# Patient Record
Sex: MALE | Race: BLACK OR AFRICAN AMERICAN | NOT HISPANIC OR LATINO | URBAN - METROPOLITAN AREA
[De-identification: names, ages, dates, MRNs, and addresses within clinical notes are randomized per-mention and may not be internally consistent; named-entity substitution may affect disease eponyms.]

---

## 2019-12-05 ENCOUNTER — EMERGENCY (EMERGENCY)
Facility: HOSPITAL | Age: 64
LOS: 1 days | Discharge: HOME | End: 2019-12-05
Attending: EMERGENCY MEDICINE | Admitting: EMERGENCY MEDICINE
Payer: MEDICAID

## 2019-12-05 VITALS
HEART RATE: 63 BPM | RESPIRATION RATE: 18 BRPM | DIASTOLIC BLOOD PRESSURE: 71 MMHG | TEMPERATURE: 98 F | SYSTOLIC BLOOD PRESSURE: 129 MMHG | OXYGEN SATURATION: 100 %

## 2019-12-05 VITALS
TEMPERATURE: 99 F | HEART RATE: 73 BPM | DIASTOLIC BLOOD PRESSURE: 90 MMHG | OXYGEN SATURATION: 99 % | RESPIRATION RATE: 18 BRPM | SYSTOLIC BLOOD PRESSURE: 144 MMHG

## 2019-12-05 LAB
ALBUMIN SERPL ELPH-MCNC: 4.7 G/DL — SIGNIFICANT CHANGE UP (ref 3.3–5)
ALP SERPL-CCNC: 85 U/L — SIGNIFICANT CHANGE UP (ref 40–120)
ALT FLD-CCNC: 32 U/L — SIGNIFICANT CHANGE UP (ref 4–41)
ANION GAP SERPL CALC-SCNC: 13 MMO/L — SIGNIFICANT CHANGE UP (ref 7–14)
AST SERPL-CCNC: 42 U/L — HIGH (ref 4–40)
BILIRUB SERPL-MCNC: < 0.2 MG/DL — LOW (ref 0.2–1.2)
BUN SERPL-MCNC: 10 MG/DL — SIGNIFICANT CHANGE UP (ref 7–23)
CALCIUM SERPL-MCNC: 9.5 MG/DL — SIGNIFICANT CHANGE UP (ref 8.4–10.5)
CHLORIDE SERPL-SCNC: 101 MMOL/L — SIGNIFICANT CHANGE UP (ref 98–107)
CO2 SERPL-SCNC: 25 MMOL/L — SIGNIFICANT CHANGE UP (ref 22–31)
CREAT SERPL-MCNC: 0.75 MG/DL — SIGNIFICANT CHANGE UP (ref 0.5–1.3)
GLUCOSE SERPL-MCNC: 92 MG/DL — SIGNIFICANT CHANGE UP (ref 70–99)
HCT VFR BLD CALC: 32.8 % — LOW (ref 39–50)
HGB BLD-MCNC: 9.5 G/DL — LOW (ref 13–17)
MCHC RBC-ENTMCNC: 22.9 PG — LOW (ref 27–34)
MCHC RBC-ENTMCNC: 29 % — LOW (ref 32–36)
MCV RBC AUTO: 79.2 FL — LOW (ref 80–100)
NRBC # FLD: 0 K/UL — SIGNIFICANT CHANGE UP (ref 0–0)
OB PNL STL: NEGATIVE — SIGNIFICANT CHANGE UP
PLATELET # BLD AUTO: 304 K/UL — SIGNIFICANT CHANGE UP (ref 150–400)
PMV BLD: 10 FL — SIGNIFICANT CHANGE UP (ref 7–13)
POTASSIUM SERPL-MCNC: 3.8 MMOL/L — SIGNIFICANT CHANGE UP (ref 3.5–5.3)
POTASSIUM SERPL-SCNC: 3.8 MMOL/L — SIGNIFICANT CHANGE UP (ref 3.5–5.3)
PROT SERPL-MCNC: 8.2 G/DL — SIGNIFICANT CHANGE UP (ref 6–8.3)
RBC # BLD: 4.14 M/UL — LOW (ref 4.2–5.8)
RBC # FLD: 16.9 % — HIGH (ref 10.3–14.5)
SODIUM SERPL-SCNC: 139 MMOL/L — SIGNIFICANT CHANGE UP (ref 135–145)
TROPONIN T, HIGH SENSITIVITY: 8 NG/L — SIGNIFICANT CHANGE UP (ref ?–14)
TROPONIN T, HIGH SENSITIVITY: 8 NG/L — SIGNIFICANT CHANGE UP (ref ?–14)
WBC # BLD: 4.92 K/UL — SIGNIFICANT CHANGE UP (ref 3.8–10.5)
WBC # FLD AUTO: 4.92 K/UL — SIGNIFICANT CHANGE UP (ref 3.8–10.5)

## 2019-12-05 PROCEDURE — 99284 EMERGENCY DEPT VISIT MOD MDM: CPT | Mod: 25

## 2019-12-05 PROCEDURE — 71046 X-RAY EXAM CHEST 2 VIEWS: CPT | Mod: 26

## 2019-12-05 RX ORDER — ACETAMINOPHEN 500 MG
650 TABLET ORAL ONCE
Refills: 0 | Status: COMPLETED | OUTPATIENT
Start: 2019-12-05 | End: 2019-12-05

## 2019-12-05 RX ORDER — LEVETIRACETAM 250 MG/1
1000 TABLET, FILM COATED ORAL ONCE
Refills: 0 | Status: COMPLETED | OUTPATIENT
Start: 2019-12-05 | End: 2019-12-05

## 2019-12-05 RX ORDER — QUETIAPINE FUMARATE 200 MG/1
50 TABLET, FILM COATED ORAL ONCE
Refills: 0 | Status: COMPLETED | OUTPATIENT
Start: 2019-12-05 | End: 2019-12-05

## 2019-12-05 RX ADMIN — QUETIAPINE FUMARATE 50 MILLIGRAM(S): 200 TABLET, FILM COATED ORAL at 21:03

## 2019-12-05 RX ADMIN — Medication 650 MILLIGRAM(S): at 16:00

## 2019-12-05 RX ADMIN — LEVETIRACETAM 1000 MILLIGRAM(S): 250 TABLET, FILM COATED ORAL at 21:03

## 2019-12-05 NOTE — ED PROVIDER NOTE - CLINICAL SUMMARY MEDICAL DECISION MAKING FREE TEXT BOX
65 yo m w/ multiple medical comorbidities and psych d/o pw cp x 1 day. pt well appearing, vss, physical exam benign. ekg nsr. pt reports pain in r side near 2nd ICS which is reproducible. pt mobile w/ no leg swelling, unlikely pe. pt has cardiac hx however sx do not appear consistent w/ acs. will obtain cxr, labs to r/o acs. no abd component to complaint or exam, will not pursue at this time. reassess. 63 yo m w/ multiple medical comorbidities and psych d/o pw cp x 1 day. pt well appearing, vss, physical exam benign. ekg nsr. pt reports pain in r side near 2nd ICS which is reproducible. pt mobile w/ no leg swelling, has no known PE risk factors, low risk by Well's, unlikely pe. pt has cardiac hx however sx do not appear consistent w/ acs. will obtain cxr, labs to r/o acs. no abd component to complaint or exam, will not pursue at this time. reassess.

## 2019-12-05 NOTE — ED PROVIDER NOTE - NSFOLLOWUPINSTRUCTIONS_ED_ALL_ED_FT
1) Please follow up with your Primary Care Provider in 24-48 hours  2) Seek immediate medical care for any new or returning symptoms including but not limited severe pain, high fevers, difficulty breathing  3) Take Tylenol 650 mg every 4-6 hours as needed for pain. Do not take more than 2 grams within a 24 hour period

## 2019-12-05 NOTE — PROVIDER CONTACT NOTE (OTHER) - ASSESSMENT
Pt is returning to [facility], called [employer], office was closed. Pt is Bed bound, Schizophrenia, arranged [facility]. P/U 10 PM.

## 2019-12-05 NOTE — ED ADULT NURSE NOTE - OBJECTIVE STATEMENT
Patient presents from [facility] for multiple complaints as per transfer paper states "bloating, gas pain, and aggression and attempting to elope." In ed patient is cooperative but a poor historian and endorsing right side chest pain x 1 week, accompanied with MENDOZA." Currently appears in NAD speaking in complete sentences, NSR on cardiac monitor. PCA and MD made aware patient risk for elopement will move closer to RN station for enhanced supervision. Will ctm.

## 2019-12-05 NOTE — ED PROVIDER NOTE - OBJECTIVE STATEMENT
65 yo m pmh htn, hld, cad, epilepsy d/o, cva, schizophrenia, pw cp. pt reports one day hx of r sided anterior cp, non radiating, non positional, non exertional, non pleuritic, aching, intermittent, no known exacerbating/remitting factors, reports hx of similar sx in past. reports hx MI in past. denies hx dvt/pe. reports was in [facility] for a "few days" recently for "epilepsy". triage note mentions abd pain visit to OSH yesterday however pt denies this. denies abd pain, n/v, f/c, sob, trauma, n/t. 63 yo m pmh htn, hld, cad, epilepsy d/o, cva, schizophrenia, pw cp. pt reports one day hx of r sided anterior cp, non radiating, non positional, non exertional, non pleuritic, aching, intermittent, no known exacerbating/remitting factors, reports hx of similar sx in past. reports hx MI in past. denies hx dvt/pe. reports was in [facility] for a "few days" recently for "epilepsy". triage note mentions abd pain visit to OSH yesterday however pt denies this. denies abd pain, n/v, f/c, sob, trauma, n/t. Is well appearing and in NAD upon initial eval.

## 2019-12-05 NOTE — ED ADULT TRIAGE NOTE - CHIEF COMPLAINT QUOTE
Arrives via EMS with c/o chest pain since 11 AM.  Pt evaluated at [facility] yesterday with abdominal pain. Arrives via EMS with c/o chest pain since 11 AM.  Pt evaluated at [facility] yesterday with abdominal pain.  MOLST Full Code

## 2019-12-05 NOTE — ED PROVIDER NOTE - PROGRESS NOTE DETAILS
Patient reassessed, NAD, non-toxic appearing. results dw pt, questions answered. pt reports sx improvement. dc w/ strict return precautions. aaox3  - Preston Moore D.O. PGY2

## 2019-12-05 NOTE — ED PROVIDER NOTE - PATIENT PORTAL LINK FT
You can access the FollowMyHealth Patient Portal offered by [facility] by registering at the following website: [email or web address]. By joining [employer]’s FollowMyHealth portal, you will also be able to view your health information using other applications (apps) compatible with our system.

## 2019-12-05 NOTE — ED PROVIDER NOTE - NS ED ROS FT
GENERAL: No fever or chills, //             EYES: no change in vision, //             HEENT: no trouble swallowing or speaking, //             CARDIAC:  chest pain, //              PULMONARY: no cough or SOB, //             GI: no abdominal pain, no nausea or no vomiting, no diarrhea or constipation, //             : No changes in urination,  //            SKIN: no rashes,  //            NEURO: no headache,  //             MSK: No joint pain otherwise as HPI or negative. ~Preston Moore DO PGY2

## 2019-12-05 NOTE — ED ADULT NURSE NOTE - CHIEF COMPLAINT QUOTE
Arrives via EMS with c/o chest pain since 11 AM.  Pt evaluated at [facility] yesterday with abdominal pain.  MOLST Full Code

## 2019-12-05 NOTE — ED PROVIDER NOTE - ATTENDING CONTRIBUTION TO CARE
Attending Attestation: Dr. Pimentel  I have personally performed a history and physical examination of the patient and discussed management with the resident as well as the patient.  I reviewed the resident's note and agree with the documented findings and plan of care.  I have authored and modified critical sections of the Provider Note, including but not limited to HPI, Physical Exam and MDM. 65 yo m w/ multiple medical comorbidities and psych d/o pw cp x 1 day. pt well appearing, vss, physical exam benign. ekg nsr. pt reports pain in r side near 2nd ICS which is reproducible. pt mobile w/ no leg swelling, has no known PE risk factors, low risk by Well's, unlikely pe. pt has cardiac hx however sx do not appear consistent w/ acs. will obtain cxr, labs to r/o acs. no abd component to complaint or exam, will not pursue at this time. reassess.

## 2019-12-05 NOTE — ED PROVIDER NOTE - PHYSICAL EXAMINATION
General: well appearing, interactive, well nourished, no apparent distress, ncat  HEENT: EOMI, PERRLA, normal mucosa, normal oropharynx, no lesions on the lips or on oral mucosa, normal external ear  Neck: supple, no lymphadenopathy, full range of motion, no nuchal rigidity  CV: RRR, normal S1 and S2 with no murmur, capillary refill less than two seconds, pp 2+   Resp: lungs CTA b/l, good aeration bilaterally, symmetric chest wall   Abd: non-distended, soft, non-tender  : no CVA tenderness  MSK: full range of motion, no cyanosis, no edema, no clubbing, no immobility  Neuro: CN II-XII grossly intact, muscle strength 5/5 in all extremities, normal gait  Skin: no rashes, skin intact General: well appearing, interactive, well nourished, no apparent distress, ncat  HEENT: EOMI, PERRLA, normal mucosa, normal oropharynx, no lesions on the lips or on oral mucosa, normal external ear  Neck: supple, no lymphadenopathy, full range of motion, no nuchal rigidity  CV: RRR, normal S1 and S2 with no murmur, capillary refill less than two seconds, pp 2+   Resp: lungs CTA b/l, good aeration bilaterally, symmetric chest wall   Abd: non-distended, soft, non-tender  : no CVA tenderness  MSK: full range of motion, no cyanosis, no edema, no clubbing, no immobility  Neuro: CN II-XII grossly intact, muscle strength 5/5 in all extremities, normal gait, slow speech at baseline (residua from CVA)  Skin: no rashes, skin intact General: well appearing, interactive, well nourished, no apparent distress, ncat  HEENT: EOMI, PERRLA, normal mucosa, normal oropharynx, no lesions on the lips or on oral mucosa, normal external ear  Neck: supple, no lymphadenopathy, full range of motion, no nuchal rigidity  CV: RRR, normal S1 and S2 with no murmur, capillary refill less than two seconds, pp 2+   Resp: lungs CTA b/l, good aeration bilaterally, symmetric chest wall   Abd: non-distended, soft, non-tender  : no CVA tenderness  MSK: full range of motion, no cyanosis, no edema, no clubbing, no immobility  Neuro: CN II-XII grossly intact, muscle strength 5/5 in all extremities, normal gait, slow speech at baseline (residua from CVA)  Skin: no rashes, skin intact  rectal: no hemorrhoids, brown stool, (chaperone: viktoriya, rn)

## 2019-12-05 NOTE — ED ADULT NURSE REASSESSMENT NOTE - NS ED NURSE REASSESS COMMENT FT1
Unable to obtain IV access, 2 RN with unsuccessful attempts for IV access. MD Pimentel and Teresa aware for US guided IV.

## 2020-01-14 ENCOUNTER — INPATIENT (INPATIENT)
Facility: HOSPITAL | Age: 65
LOS: 1 days | Discharge: NURSING, REHAB OR LONG-TERM CARE | DRG: 313 | End: 2020-01-16
Attending: INTERNAL MEDICINE | Admitting: INTERNAL MEDICINE
Payer: MEDICAID

## 2020-01-14 VITALS
HEART RATE: 84 BPM | SYSTOLIC BLOOD PRESSURE: 150 MMHG | WEIGHT: 203.93 LBS | OXYGEN SATURATION: 96 % | RESPIRATION RATE: 18 BRPM | HEIGHT: 69 IN | DIASTOLIC BLOOD PRESSURE: 93 MMHG | TEMPERATURE: 98 F

## 2020-01-14 DIAGNOSIS — E11.9 TYPE 2 DIABETES MELLITUS WITHOUT COMPLICATIONS: ICD-10-CM

## 2020-01-14 DIAGNOSIS — F20.9 SCHIZOPHRENIA, UNSPECIFIED: ICD-10-CM

## 2020-01-14 DIAGNOSIS — R07.9 CHEST PAIN, UNSPECIFIED: ICD-10-CM

## 2020-01-14 DIAGNOSIS — R79.1 ABNORMAL COAGULATION PROFILE: ICD-10-CM

## 2020-01-14 DIAGNOSIS — G40.909 EPILEPSY, UNSPECIFIED, NOT INTRACTABLE, WITHOUT STATUS EPILEPTICUS: ICD-10-CM

## 2020-01-14 DIAGNOSIS — D64.9 ANEMIA, UNSPECIFIED: ICD-10-CM

## 2020-01-14 DIAGNOSIS — Z29.9 ENCOUNTER FOR PROPHYLACTIC MEASURES, UNSPECIFIED: ICD-10-CM

## 2020-01-14 LAB
APTT BLD: 29.5 SEC — SIGNIFICANT CHANGE UP (ref 27.5–36.3)
APTT BLD: 31.7 SEC — SIGNIFICANT CHANGE UP (ref 27.5–36.3)
BASOPHILS # BLD AUTO: 0.03 K/UL — SIGNIFICANT CHANGE UP (ref 0–0.2)
BASOPHILS NFR BLD AUTO: 0.4 % — SIGNIFICANT CHANGE UP (ref 0–2)
EOSINOPHIL # BLD AUTO: 0.02 K/UL — SIGNIFICANT CHANGE UP (ref 0–0.5)
EOSINOPHIL NFR BLD AUTO: 0.3 % — SIGNIFICANT CHANGE UP (ref 0–6)
GLUCOSE BLDC GLUCOMTR-MCNC: 103 MG/DL — HIGH (ref 70–99)
GLUCOSE BLDC GLUCOMTR-MCNC: 115 MG/DL — HIGH (ref 70–99)
GLUCOSE BLDC GLUCOMTR-MCNC: 115 MG/DL — HIGH (ref 70–99)
GLUCOSE BLDC GLUCOMTR-MCNC: 118 MG/DL — HIGH (ref 70–99)
HCT VFR BLD CALC: 31.2 % — LOW (ref 39–50)
HCT VFR BLD CALC: 31.2 % — LOW (ref 39–50)
HGB BLD-MCNC: 8.9 G/DL — LOW (ref 13–17)
HGB BLD-MCNC: 9.3 G/DL — LOW (ref 13–17)
IMM GRANULOCYTES NFR BLD AUTO: 0.3 % — SIGNIFICANT CHANGE UP (ref 0–1.5)
INR BLD: 1.14 RATIO — SIGNIFICANT CHANGE UP (ref 0.88–1.16)
INR BLD: 1.2 RATIO — HIGH (ref 0.88–1.16)
LIDOCAIN IGE QN: 20 U/L — SIGNIFICANT CHANGE UP (ref 7–60)
LYMPHOCYTES # BLD AUTO: 0.85 K/UL — LOW (ref 1–3.3)
LYMPHOCYTES # BLD AUTO: 12.6 % — LOW (ref 13–44)
MCHC RBC-ENTMCNC: 22.3 PG — LOW (ref 27–34)
MCHC RBC-ENTMCNC: 22.9 PG — LOW (ref 27–34)
MCHC RBC-ENTMCNC: 28.5 GM/DL — LOW (ref 32–36)
MCHC RBC-ENTMCNC: 29.8 GM/DL — LOW (ref 32–36)
MCV RBC AUTO: 76.7 FL — LOW (ref 80–100)
MCV RBC AUTO: 78.2 FL — LOW (ref 80–100)
MONOCYTES # BLD AUTO: 0.34 K/UL — SIGNIFICANT CHANGE UP (ref 0–0.9)
MONOCYTES NFR BLD AUTO: 5.1 % — SIGNIFICANT CHANGE UP (ref 2–14)
NEUTROPHILS # BLD AUTO: 5.46 K/UL — SIGNIFICANT CHANGE UP (ref 1.8–7.4)
NEUTROPHILS NFR BLD AUTO: 81.3 % — HIGH (ref 43–77)
NRBC # BLD: 0 /100 WBCS — SIGNIFICANT CHANGE UP (ref 0–0)
PLATELET # BLD AUTO: 264 K/UL — SIGNIFICANT CHANGE UP (ref 150–400)
PLATELET # BLD AUTO: 308 K/UL — SIGNIFICANT CHANGE UP (ref 150–400)
PROTHROM AB SERPL-ACNC: 13.2 SEC — HIGH (ref 10–13.1)
PROTHROM AB SERPL-ACNC: 13.8 SEC — HIGH (ref 10–12.9)
RBC # BLD: 3.99 M/UL — LOW (ref 4.2–5.8)
RBC # BLD: 4.07 M/UL — LOW (ref 4.2–5.8)
RBC # FLD: 16.7 % — HIGH (ref 10.3–14.5)
RBC # FLD: 16.8 % — HIGH (ref 10.3–14.5)
TROPONIN T, HIGH SENSITIVITY RESULT: 13 NG/L — SIGNIFICANT CHANGE UP (ref 0–51)
TROPONIN T, HIGH SENSITIVITY RESULT: 14 NG/L — SIGNIFICANT CHANGE UP (ref 0–51)
WBC # BLD: 5.85 K/UL — SIGNIFICANT CHANGE UP (ref 3.8–10.5)
WBC # BLD: 6.72 K/UL — SIGNIFICANT CHANGE UP (ref 3.8–10.5)
WBC # FLD AUTO: 5.85 K/UL — SIGNIFICANT CHANGE UP (ref 3.8–10.5)
WBC # FLD AUTO: 6.72 K/UL — SIGNIFICANT CHANGE UP (ref 3.8–10.5)

## 2020-01-14 PROCEDURE — 71045 X-RAY EXAM CHEST 1 VIEW: CPT | Mod: 26

## 2020-01-14 PROCEDURE — 99285 EMERGENCY DEPT VISIT HI MDM: CPT

## 2020-01-14 PROCEDURE — 99223 1ST HOSP IP/OBS HIGH 75: CPT | Mod: AI,GC

## 2020-01-14 PROCEDURE — 93306 TTE W/DOPPLER COMPLETE: CPT | Mod: 26

## 2020-01-14 RX ORDER — METOPROLOL TARTRATE 50 MG
25 TABLET ORAL DAILY
Refills: 0 | Status: DISCONTINUED | OUTPATIENT
Start: 2020-01-14 | End: 2020-01-16

## 2020-01-14 RX ORDER — FAMOTIDINE 10 MG/ML
20 INJECTION INTRAVENOUS ONCE
Refills: 0 | Status: COMPLETED | OUTPATIENT
Start: 2020-01-14 | End: 2020-01-14

## 2020-01-14 RX ORDER — GLUCAGON INJECTION, SOLUTION 0.5 MG/.1ML
1 INJECTION, SOLUTION SUBCUTANEOUS ONCE
Refills: 0 | Status: DISCONTINUED | OUTPATIENT
Start: 2020-01-14 | End: 2020-01-16

## 2020-01-14 RX ORDER — INSULIN LISPRO 100/ML
VIAL (ML) SUBCUTANEOUS
Refills: 0 | Status: DISCONTINUED | OUTPATIENT
Start: 2020-01-14 | End: 2020-01-16

## 2020-01-14 RX ORDER — ENOXAPARIN SODIUM 100 MG/ML
40 INJECTION SUBCUTANEOUS DAILY
Refills: 0 | Status: DISCONTINUED | OUTPATIENT
Start: 2020-01-14 | End: 2020-01-16

## 2020-01-14 RX ORDER — TAMSULOSIN HYDROCHLORIDE 0.4 MG/1
0.4 CAPSULE ORAL AT BEDTIME
Refills: 0 | Status: DISCONTINUED | OUTPATIENT
Start: 2020-01-14 | End: 2020-01-16

## 2020-01-14 RX ORDER — ASPIRIN/CALCIUM CARB/MAGNESIUM 324 MG
81 TABLET ORAL DAILY
Refills: 0 | Status: DISCONTINUED | OUTPATIENT
Start: 2020-01-14 | End: 2020-01-16

## 2020-01-14 RX ORDER — HALOPERIDOL DECANOATE 100 MG/ML
5 INJECTION INTRAMUSCULAR ONCE
Refills: 0 | Status: COMPLETED | OUTPATIENT
Start: 2020-01-14 | End: 2020-01-14

## 2020-01-14 RX ORDER — SODIUM CHLORIDE 9 MG/ML
1000 INJECTION, SOLUTION INTRAVENOUS
Refills: 0 | Status: DISCONTINUED | OUTPATIENT
Start: 2020-01-14 | End: 2020-01-16

## 2020-01-14 RX ORDER — INSULIN LISPRO 100/ML
VIAL (ML) SUBCUTANEOUS AT BEDTIME
Refills: 0 | Status: DISCONTINUED | OUTPATIENT
Start: 2020-01-14 | End: 2020-01-16

## 2020-01-14 RX ORDER — DEXTROSE 50 % IN WATER 50 %
25 SYRINGE (ML) INTRAVENOUS ONCE
Refills: 0 | Status: DISCONTINUED | OUTPATIENT
Start: 2020-01-14 | End: 2020-01-16

## 2020-01-14 RX ORDER — ISOSORBIDE MONONITRATE 60 MG/1
30 TABLET, EXTENDED RELEASE ORAL DAILY
Refills: 0 | Status: DISCONTINUED | OUTPATIENT
Start: 2020-01-14 | End: 2020-01-14

## 2020-01-14 RX ORDER — QUETIAPINE FUMARATE 200 MG/1
50 TABLET, FILM COATED ORAL
Refills: 0 | Status: DISCONTINUED | OUTPATIENT
Start: 2020-01-14 | End: 2020-01-15

## 2020-01-14 RX ORDER — ACETAMINOPHEN 500 MG
650 TABLET ORAL ONCE
Refills: 0 | Status: COMPLETED | OUTPATIENT
Start: 2020-01-14 | End: 2020-01-14

## 2020-01-14 RX ORDER — DEXTROSE 50 % IN WATER 50 %
12.5 SYRINGE (ML) INTRAVENOUS ONCE
Refills: 0 | Status: DISCONTINUED | OUTPATIENT
Start: 2020-01-14 | End: 2020-01-16

## 2020-01-14 RX ORDER — DEXTROSE 50 % IN WATER 50 %
15 SYRINGE (ML) INTRAVENOUS ONCE
Refills: 0 | Status: DISCONTINUED | OUTPATIENT
Start: 2020-01-14 | End: 2020-01-16

## 2020-01-14 RX ORDER — OXCARBAZEPINE 300 MG/1
600 TABLET, FILM COATED ORAL
Refills: 0 | Status: DISCONTINUED | OUTPATIENT
Start: 2020-01-14 | End: 2020-01-16

## 2020-01-14 RX ORDER — KETOROLAC TROMETHAMINE 30 MG/ML
15 SYRINGE (ML) INJECTION ONCE
Refills: 0 | Status: DISCONTINUED | OUTPATIENT
Start: 2020-01-14 | End: 2020-01-14

## 2020-01-14 RX ORDER — LEVETIRACETAM 250 MG/1
1000 TABLET, FILM COATED ORAL
Refills: 0 | Status: DISCONTINUED | OUTPATIENT
Start: 2020-01-14 | End: 2020-01-16

## 2020-01-14 RX ORDER — CITALOPRAM 10 MG/1
10 TABLET, FILM COATED ORAL DAILY
Refills: 0 | Status: DISCONTINUED | OUTPATIENT
Start: 2020-01-14 | End: 2020-01-15

## 2020-01-14 RX ORDER — SIMVASTATIN 20 MG/1
40 TABLET, FILM COATED ORAL AT BEDTIME
Refills: 0 | Status: DISCONTINUED | OUTPATIENT
Start: 2020-01-14 | End: 2020-01-16

## 2020-01-14 RX ADMIN — QUETIAPINE FUMARATE 50 MILLIGRAM(S): 200 TABLET, FILM COATED ORAL at 18:03

## 2020-01-14 RX ADMIN — Medication 2 MILLIGRAM(S): at 02:45

## 2020-01-14 RX ADMIN — HALOPERIDOL DECANOATE 5 MILLIGRAM(S): 100 INJECTION INTRAMUSCULAR at 20:19

## 2020-01-14 RX ADMIN — FAMOTIDINE 20 MILLIGRAM(S): 10 INJECTION INTRAVENOUS at 06:34

## 2020-01-14 RX ADMIN — LEVETIRACETAM 1000 MILLIGRAM(S): 250 TABLET, FILM COATED ORAL at 18:03

## 2020-01-14 RX ADMIN — CITALOPRAM 10 MILLIGRAM(S): 10 TABLET, FILM COATED ORAL at 11:58

## 2020-01-14 RX ADMIN — Medication 81 MILLIGRAM(S): at 11:58

## 2020-01-14 RX ADMIN — Medication 15 MILLIGRAM(S): at 13:25

## 2020-01-14 RX ADMIN — HALOPERIDOL DECANOATE 5 MILLIGRAM(S): 100 INJECTION INTRAMUSCULAR at 02:45

## 2020-01-14 RX ADMIN — Medication 25 MILLIGRAM(S): at 18:31

## 2020-01-14 RX ADMIN — SIMVASTATIN 40 MILLIGRAM(S): 20 TABLET, FILM COATED ORAL at 21:14

## 2020-01-14 RX ADMIN — Medication 15 MILLIGRAM(S): at 14:13

## 2020-01-14 RX ADMIN — TAMSULOSIN HYDROCHLORIDE 0.4 MILLIGRAM(S): 0.4 CAPSULE ORAL at 21:14

## 2020-01-14 RX ADMIN — Medication 650 MILLIGRAM(S): at 18:30

## 2020-01-14 RX ADMIN — Medication 30 MILLILITER(S): at 06:34

## 2020-01-14 RX ADMIN — ENOXAPARIN SODIUM 40 MILLIGRAM(S): 100 INJECTION SUBCUTANEOUS at 11:59

## 2020-01-14 RX ADMIN — OXCARBAZEPINE 600 MILLIGRAM(S): 300 TABLET, FILM COATED ORAL at 18:31

## 2020-01-14 NOTE — H&P ADULT - PROBLEM SELECTOR PLAN 1
Recently admitted to [facility], where work-up was done.   - Attempted to reach next of kin, Aunt Lia, who was busy when called. Asked to call later.  - EKG shows NSR. HStrop wnl. Will f/u repeat.  - obtain records from [facility].  - Continue imdur and simvastatin.

## 2020-01-14 NOTE — CHART NOTE - NSCHARTNOTEFT_GEN_A_CORE
Called  by  Rn  to  evaluate, Pt agitated,, trying to get up off stretcher. Difficult to redirect, appeared to be at harm to self and others. Not cooperating with medical staff. EKG demonstrated normal Qtc (444). Pt is on standing doses of seroquil and haldol decanoate at his facility.  Haldol 5mg IVPX1 given for agitation.  Pt  placed  on  1:1  at  this  time  for  safety. Will continue to monitor.

## 2020-01-14 NOTE — H&P ADULT - HISTORY OF PRESENT ILLNESS
63 yo M w/ PMHx of epilepsy, CAD, GERD, schizophrenia, HLD, depressive, BPH, DM, bipolar, dementia, HTN, TIA, ICH presents to ED via EMS from nursing home c/o chest pain. Per EMS pt was recently seen at [facility] 2 days ago for same symptoms and d/c back to nursing home after negative cardiac workup. Upon exam pt agitated, yelling, combative with staff. Pt reports pain but uncooperative to answer questions or participate in assessment. Per chart from nursing home pt takes seroquil and haldol at facility, unclear of time of last dose. 64 M with epilepsy, CAD, GERD, schizophrenia, HLD, depression, BPH, DM2, dementia, HTN, TIA presents to ED from nursing home for CP. Patient was admitted to [facility] 2 days ago for same symptoms with negative cardiac work-up and apparently discharged to nursing home, per EMS report. On initial presentation to ED, patient was agitated and yelling at staff. He was given haldol and ativan and is now slow to respond. Nursing home records include diagnoses and home medications, but not much else. Patient is prescribed 125 ug haldol q month.    Patient unable to answer questions currently: very slow to respond. Currently A+Ox1. Cannot elaborate on CP. Had a run on SVT on telemetry, which self-resolved. Vitals are otherwise stable.

## 2020-01-14 NOTE — H&P ADULT - ATTENDING COMMENTS
64 M with epilepsy, CAD, GERD, schizophrenia, HLD, depression, BPH, DM2, dementia, HTN, TIA presents from nursing home for CP, agitated in ED s/p Haldol and Ativan, now unable to give Hx., brief SVT on tele.  - Tele monitor for further arrythmia, will check serial cardiac enzymes  - will attempt to obtain additional Hx and recent w/u from [facility] later today 64 M with epilepsy, CAD, GERD, schizophrenia, HLD, depression, BPH, DM2, dementia, HTN, TIA presents from nursing home for CP, agitated in ED s/p Haldol and Ativan, now unable to give Hx., brief SVT on tele.  - Tele monitor for further arrythmia, will check serial cardiac enzymes  - will check FLP, A1c and TSH for risk modification  - will attempt to obtain additional Hx and recent w/u from [facility] later today

## 2020-01-14 NOTE — ED PROVIDER NOTE - OBJECTIVE STATEMENT
63 y/o M w/ PMH of CAD, HTN, HLD, schizophrenia, seizure disorder, presenting w/ chest pain. Seen in Room 1R. Pt A&O x2 (place and birth date). Reports developing chest pain over the past day. Pt points to center of chest when asked where his pain is. Denies difficulty breathing. Pt uncooperative with much of questioning. Became agitated and started yelling at staff, required treatment w/ haldol and ativan. Per EMS report pt was seen at OSH 2 days for chest pain and was DC'ed back to nursing home after negative workup.

## 2020-01-14 NOTE — H&P ADULT - ASSESSMENT
64 M with epilepsy, CAD, GERD, schizophrenia, HLD, depression, BPH, DM2, dementia, HTN, TIA presents to ED from nursing home for CP, admitted for further cardiac work-up.

## 2020-01-14 NOTE — ED PROVIDER NOTE - PHYSICAL EXAMINATION
Gen: Agitated, AOx2, non-toxic  Head: NCAT  HEENT: EOMI, oral mucosa moist, normal conjunctiva  Lung: CTAB, no respiratory distress, no wheezes/rhonchi/rales B/L, speaking in full sentences  CV: RRR, no murmurs  Abd: soft, NTND, no guarding  MSK: no visible deformities  Neuro: No focal sensory or motor deficits  Skin: Warm, well perfused  Psych: normal affect

## 2020-01-14 NOTE — CONSULT NOTE ADULT - SUBJECTIVE AND OBJECTIVE BOX
CHIEF COMPLAINT:Patient is a 64y old  Male who presents with a chief complaint of CP (14 Jan 2020 07:49)      HPI:  64 M with epilepsy, CAD, GERD, schizophrenia, HLD, depression, BPH, DM2, dementia, HTN, TIA presents to ED from nursing home for CP. Patient was admitted to [facility] 2 days ago for same symptoms with negative cardiac work-up and apparently discharged to nursing home, per EMS report. On initial presentation to ED, patient was agitated and yelling at staff. He was given haldol and ativan and is now slow to respond. Nursing home records include diagnoses and home medications, but not much else. Patient is prescribed 125 ug haldol q month.    Patient unable to answer questions currently: very slow to respond. Currently A+Ox1. Cannot elaborate on CP. Had a run on SVT on telemetry, which self-resolved. Vitals are otherwise stable. (14 Jan 2020 05:42)      PAST MEDICAL & SURGICAL HISTORY:  History of TIAs  History of BPH  Hyperlipidemia  Type 2 diabetes mellitus  Schizophrenia  No significant past surgical history      MEDICATIONS  (STANDING):  aspirin  chewable 81 milliGRAM(s) Oral daily  citalopram 10 milliGRAM(s) Oral daily  dextrose 5%. 1000 milliLiter(s) (50 mL/Hr) IV Continuous <Continuous>  dextrose 50% Injectable 12.5 Gram(s) IV Push once  dextrose 50% Injectable 25 Gram(s) IV Push once  dextrose 50% Injectable 25 Gram(s) IV Push once  enoxaparin Injectable 40 milliGRAM(s) SubCutaneous daily  insulin lispro (HumaLOG) corrective regimen sliding scale   SubCutaneous three times a day before meals  insulin lispro (HumaLOG) corrective regimen sliding scale   SubCutaneous at bedtime  levETIRAcetam 1000 milliGRAM(s) Oral two times a day  metoprolol succinate ER 25 milliGRAM(s) Oral daily  OXcarbazepine 600 milliGRAM(s) Oral two times a day  QUEtiapine 50 milliGRAM(s) Oral two times a day  simvastatin 40 milliGRAM(s) Oral at bedtime  tamsulosin 0.4 milliGRAM(s) Oral at bedtime    MEDICATIONS  (PRN):  dextrose 40% Gel 15 Gram(s) Oral once PRN Blood Glucose LESS THAN 70 milliGRAM(s)/deciliter  glucagon  Injectable 1 milliGRAM(s) IntraMuscular once PRN Glucose LESS THAN 70 milligrams/deciliter      FAMILY HISTORY:  No pertinent family history in first degree relatives      SOCIAL HISTORY:    [ ] Non-smoker  [ ] Smoker  [ ] Alcohol    Allergies    Allergy Status Unknown    Intolerances    	    REVIEW OF SYSTEMS:  CONSTITUTIONAL: No fever, weight loss, or fatigue  EYES: No eye pain, visual disturbances, or discharge  ENT:  No difficulty hearing, tinnitus, vertigo; No sinus or throat pain  NECK: No pain or stiffness  RESPIRATORY: No cough, wheezing, chills or hemoptysis; No Shortness of Breath  CARDIOVASCULAR: No chest pain, palpitations, passing out, dizziness, or leg swelling  GASTROINTESTINAL: No abdominal or epigastric pain. No nausea, vomiting, or hematemesis; No diarrhea or constipation. No melena or hematochezia.  GENITOURINARY: No dysuria, frequency, hematuria, or incontinence  NEUROLOGICAL: No headaches, memory loss, loss of strength, numbness, or tremors  SKIN: No itching, burning, rashes, or lesions   LYMPH Nodes: No enlarged glands  ENDOCRINE: No heat or cold intolerance; No hair loss  MUSCULOSKELETAL: No joint pain or swelling; No muscle, back, or extremity pain  PSYCHIATRIC: No depression, anxiety, mood swings, or difficulty sleeping  HEME/LYMPH: No easy bruising, or bleeding gums  ALLERGY AND IMMUNOLOGIC: No hives or eczema	    [ ] All others negative	  [ ] Unable to obtain    PHYSICAL EXAM:  T(C): 36.7 (01-14-20 @ 07:40), Max: 36.8 (01-14-20 @ 01:24)  HR: 80 (01-14-20 @ 07:40) (61 - 84)  BP: 138/97 (01-14-20 @ 07:40) (124/85 - 150/93)  RR: 16 (01-14-20 @ 07:40) (14 - 20)  SpO2: 98% (01-14-20 @ 07:40) (96% - 99%)  Wt(kg): --  I&O's Summary      Appearance: Normal	  HEENT:   Normal oral mucosa, PERRL, EOMI	  Lymphatic: No lymphadenopathy  Cardiovascular: Normal S1 S2, No JVD, No murmurs, No edema  Respiratory: Lungs clear to auscultation	  Psychiatry: A & O x 3, Mood & affect appropriate  Gastrointestinal:  Soft, Non-tender, + BS	  Skin: No rashes, No ecchymoses, No cyanosis	  Neurologic: Non-focal  Extremities: Normal range of motion, No clubbing, cyanosis or edema  Vascular: Peripheral pulses palpable 2+ bilaterally    TELEMETRY: 	    ECG:  	  RADIOLOGY:  OTHER: 	  	  LABS:	 	    CARDIAC MARKERS:                              9.3    6.72  )-----------( 308      ( 14 Jan 2020 03:29 )             31.2     01-14    140  |  105  |  10  ----------------------------<  126<H>  3.8   |  25  |  0.68    Ca    9.4      14 Jan 2020 03:29    TPro  8.1  /  Alb  4.4  /  TBili  0.2  /  DBili  x   /  AST  24  /  ALT  30  /  AlkPhos  87  01-14    proBNP:   Lipid Profile:   HgA1c:   TSH:   PT/INR - ( 14 Jan 2020 03:29 )   PT: 13.8 sec;   INR: 1.20 ratio         PTT - ( 14 Jan 2020 03:29 )  PTT:31.7 sec    PREVIOUS DIAGNOSTIC TESTING:    [ ] Echocardiogram:  [ ]  Catheterization:  [ ] Stress Test: CHIEF COMPLAINT:Patient is a 64y old  Male who presents with a chief complaint of CP (14 Jan 2020 07:49)      HPI:  64 M with epilepsy, CAD, GERD, schizophrenia, HLD, depression, BPH, DM2, dementia, HTN, TIA presents to ED from nursing home for CP. Patient was admitted to [facility] 2 days ago for same symptoms with negative cardiac work-up and apparently discharged to nursing home, per EMS report. On initial presentation to ED, patient was agitated and yelling at staff. He was given haldol and ativan and is now slow to respond. Nursing home records include diagnoses and home medications, but not much else. Patient is prescribed 125 ug haldol q month.    Patient unable to answer questions currently: very slow to respond. Currently A+Ox1. Cannot elaborate on CP. Had a run on SVT on telemetry, which self-resolved. Vitals are otherwise stable. (14 Jan 2020 05:42)      PAST MEDICAL & SURGICAL HISTORY:  History of TIAs  History of BPH  Hyperlipidemia  Type 2 diabetes mellitus  Schizophrenia  No significant past surgical history      MEDICATIONS  (STANDING):  aspirin  chewable 81 milliGRAM(s) Oral daily  citalopram 10 milliGRAM(s) Oral daily  dextrose 5%. 1000 milliLiter(s) (50 mL/Hr) IV Continuous <Continuous>  dextrose 50% Injectable 12.5 Gram(s) IV Push once  dextrose 50% Injectable 25 Gram(s) IV Push once  dextrose 50% Injectable 25 Gram(s) IV Push once  enoxaparin Injectable 40 milliGRAM(s) SubCutaneous daily  insulin lispro (HumaLOG) corrective regimen sliding scale   SubCutaneous three times a day before meals  insulin lispro (HumaLOG) corrective regimen sliding scale   SubCutaneous at bedtime  levETIRAcetam 1000 milliGRAM(s) Oral two times a day  metoprolol succinate ER 25 milliGRAM(s) Oral daily  OXcarbazepine 600 milliGRAM(s) Oral two times a day  QUEtiapine 50 milliGRAM(s) Oral two times a day  simvastatin 40 milliGRAM(s) Oral at bedtime  tamsulosin 0.4 milliGRAM(s) Oral at bedtime    MEDICATIONS  (PRN):  dextrose 40% Gel 15 Gram(s) Oral once PRN Blood Glucose LESS THAN 70 milliGRAM(s)/deciliter  glucagon  Injectable 1 milliGRAM(s) IntraMuscular once PRN Glucose LESS THAN 70 milligrams/deciliter      FAMILY HISTORY:  No pertinent family history in first degree relatives      SOCIAL HISTORY:    [ ] Non-smoker  [ ] Smoker  [ ] Alcohol    Allergies    Allergy Status Unknown    Intolerances    	    REVIEW OF SYSTEMS:  CONSTITUTIONAL: No fever, weight loss, or fatigue  EYES: No eye pain, visual disturbances, or discharge  ENT:  No difficulty hearing, tinnitus, vertigo; No sinus or throat pain  NECK: No pain or stiffness  RESPIRATORY: No cough, wheezing, chills or hemoptysis; + Shortness of Breath  CARDIOVASCULAR: No chest pain, palpitations, passing out, dizziness, or leg swelling  GASTROINTESTINAL: No abdominal or epigastric pain. No nausea, vomiting, or hematemesis; No diarrhea or constipation. No melena or hematochezia.  GENITOURINARY: No dysuria, frequency, hematuria, or incontinence  NEUROLOGICAL: No headaches, memory loss, loss of strength, numbness, or tremors  SKIN: No itching, burning, rashes, or lesions   LYMPH Nodes: No enlarged glands  ENDOCRINE: No heat or cold intolerance; No hair loss  MUSCULOSKELETAL: No joint pain or swelling; No muscle, back, or extremity pain  PSYCHIATRIC: No depression, anxiety, mood swings, or difficulty sleeping  HEME/LYMPH: No easy bruising, or bleeding gums  ALLERGY AND IMMUNOLOGIC: No hives or eczema	    [ ] All others negative	  [ ] Unable to obtain    PHYSICAL EXAM:  T(C): 36.7 (01-14-20 @ 07:40), Max: 36.8 (01-14-20 @ 01:24)  HR: 80 (01-14-20 @ 07:40) (61 - 84)  BP: 138/97 (01-14-20 @ 07:40) (124/85 - 150/93)  RR: 16 (01-14-20 @ 07:40) (14 - 20)  SpO2: 98% (01-14-20 @ 07:40) (96% - 99%)  Wt(kg): --  I&O's Summary      Appearance: Normal	  HEENT:   Normal oral mucosa, PERRL, EOMI	  Lymphatic: No lymphadenopathy  Cardiovascular: Normal S1 S2, No JVD, + murmurs, No edema  Respiratory: Lungs clear to auscultation	  Gastrointestinal:  Soft, Non-tender, + BS	  Skin: No rashes, No ecchymoses, No cyanosis	  Neurologic: Non-focal  Extremities: Normal range of motion, No clubbing, cyanosis or edema  Vascular: Peripheral pulses palpable 2+ bilaterally    TELEMETRY: 	    ECG:  	  RADIOLOGY:  OTHER: 	  	  LABS:	 	    CARDIAC MARKERS:                              9.3    6.72  )-----------( 308      ( 14 Jan 2020 03:29 )             31.2     01-14    140  |  105  |  10  ----------------------------<  126<H>  3.8   |  25  |  0.68    Ca    9.4      14 Jan 2020 03:29    TPro  8.1  /  Alb  4.4  /  TBili  0.2  /  DBili  x   /  AST  24  /  ALT  30  /  AlkPhos  87  01-14    proBNP:   Lipid Profile:   HgA1c:   TSH:   PT/INR - ( 14 Jan 2020 03:29 )   PT: 13.8 sec;   INR: 1.20 ratio         PTT - ( 14 Jan 2020 03:29 )  PTT:31.7 sec    PREVIOUS DIAGNOSTIC TESTING:      < from: Xray Chest 1 View- PORTABLE-Urgent (01.14.20 @ 02:47) >  The lungs are clear.  There are no pleural effusions or pneumothorax.  The cardiomediastinal silhouette is within normal limits.    IMPRESSION:   Clear lungs.

## 2020-01-14 NOTE — ED PROVIDER NOTE - CLINICAL SUMMARY MEDICAL DECISION MAKING FREE TEXT BOX
63 y/o M w/ PMH of CAD, HTN, HLD, schizophrenia, seizure disorder, presenting w/ chest pain. High risk given risk factors and age. EKG unremarkable for acute changes. Will check labs. CXR. Plan for admission for chest pain work up. 63 y/o M w/ PMH of CAD, HTN, HLD, schizophrenia, seizure disorder, presenting w/ chest pain. High risk given risk factors and age. EKG unremarkable for acute changes. Will check labs. CXR. Plan for admission for chest pain work up.    AYLIN Adler MD: Agree with resident statement above. Per report, pt seen at another ER recently, not admitted. Given psych dx and possibility of masking sxs in pt with high risk of ACS, will admit for cardiac w/u

## 2020-01-14 NOTE — H&P ADULT - NSHPPHYSICALEXAM_GEN_ALL_CORE
Vital Signs Last 24 Hrs  T(C): 36.8 (14 Jan 2020 01:24), Max: 36.8 (14 Jan 2020 01:24)  T(F): 98.2 (14 Jan 2020 01:24), Max: 98.2 (14 Jan 2020 01:24)  HR: 65 (14 Jan 2020 05:25) (65 - 84)  BP: 127/81 (14 Jan 2020 05:25) (127/81 - 150/93)  BP(mean): --  RR: 14 (14 Jan 2020 05:25) (14 - 18)  SpO2: 98% (14 Jan 2020 05:25) (96% - 98%) Vital Signs Last 24 Hrs  T(C): 36.8 (14 Jan 2020 01:24), Max: 36.8 (14 Jan 2020 01:24)  T(F): 98.2 (14 Jan 2020 01:24), Max: 98.2 (14 Jan 2020 01:24)  HR: 65 (14 Jan 2020 05:25) (65 - 84)  BP: 127/81 (14 Jan 2020 05:25) (127/81 - 150/93)  BP(mean): --  RR: 14 (14 Jan 2020 05:25) (14 - 18)  SpO2: 98% (14 Jan 2020 05:25) (96% - 98%)    PHYSICAL EXAM:  GENERAL: NAD, well-developed, slow to respond  HEAD:  Atraumatic, Normocephalic, poor dentition  EYES: EOMI, conjunctiva and sclera clear  NECK: Supple, No JVD  CHEST/LUNG: Clear to auscultation bilaterally; No wheeze, ronchi or rales  HEART: Regular rate and rhythm; No murmurs, rubs, or gallops  ABDOMEN: Soft, Nontender, Nondistended; Bowel sounds present  EXTREMITIES:  2+ Peripheral Pulses, No clubbing, cyanosis, or edema  PSYCH: AAOx1  NEUROLOGY: unable to answer questions, slow to respond  SKIN: No rashes or lesions

## 2020-01-14 NOTE — H&P ADULT - NSHPSOCIALHISTORY_GEN_ALL_CORE
Lives in nursing home. Unable to obtain remainder of social history. Lives in nursing home. Unable to obtain remainder of social history due to lethargy s/p Haldol

## 2020-01-14 NOTE — H&P ADULT - NSHPLABSRESULTS_GEN_ALL_CORE
CBC Full  -  ( 14 Jan 2020 03:29 )  WBC Count : 6.72 K/uL  RBC Count : 4.07 M/uL  Hemoglobin : 9.3 g/dL  Hematocrit : 31.2 %  Platelet Count - Automated : 308 K/uL  Mean Cell Volume : 76.7 fl  Mean Cell Hemoglobin : 22.9 pg  Mean Cell Hemoglobin Concentration : 29.8 gm/dL  Auto Neutrophil # : 5.46 K/uL  Auto Lymphocyte # : 0.85 K/uL  Auto Monocyte # : 0.34 K/uL  Auto Eosinophil # : 0.02 K/uL  Auto Basophil # : 0.03 K/uL  Auto Neutrophil % : 81.3 %  Auto Lymphocyte % : 12.6 %  Auto Monocyte % : 5.1 %  Auto Eosinophil % : 0.3 %  Auto Basophil % : 0.4 %    01-14    140  |  105  |  10  ----------------------------<  126<H>  3.8   |  25  |  0.68    Ca    9.4      14 Jan 2020 03:29    TPro  8.1  /  Alb  4.4  /  TBili  0.2  /  DBili  x   /  AST  24  /  ALT  30  /  AlkPhos  87  01-14 CBC Full  -  ( 14 Jan 2020 03:29 )  WBC Count : 6.72 K/uL  RBC Count : 4.07 M/uL  Hemoglobin : 9.3 g/dL  Hematocrit : 31.2 %  Platelet Count - Automated : 308 K/uL  Mean Cell Volume : 76.7 fl  Mean Cell Hemoglobin : 22.9 pg  Mean Cell Hemoglobin Concentration : 29.8 gm/dL  Auto Neutrophil # : 5.46 K/uL  Auto Lymphocyte # : 0.85 K/uL  Auto Monocyte # : 0.34 K/uL  Auto Eosinophil # : 0.02 K/uL  Auto Basophil # : 0.03 K/uL  Auto Neutrophil % : 81.3 %  Auto Lymphocyte % : 12.6 %  Auto Monocyte % : 5.1 %  Auto Eosinophil % : 0.3 %  Auto Basophil % : 0.4 %    01-14    140  |  105  |  10  ----------------------------<  126<H>  3.8   |  25  |  0.68    Ca    9.4      14 Jan 2020 03:29    TPro  8.1  /  Alb  4.4  /  TBili  0.2  /  DBili  x   /  AST  24  /  ALT  30  /  AlkPhos  87  01-14    EKG: NSR Labs, imaging and EKG tracing personally reviewed  CBC Full  -  ( 14 Jan 2020 03:29 )  WBC Count : 6.72 K/uL  RBC Count : 4.07 M/uL  Hemoglobin : 9.3 g/dL  Hematocrit : 31.2 %  Platelet Count - Automated : 308 K/uL  Mean Cell Volume : 76.7 fl  Mean Cell Hemoglobin : 22.9 pg  Mean Cell Hemoglobin Concentration : 29.8 gm/dL  Auto Neutrophil # : 5.46 K/uL  Auto Lymphocyte # : 0.85 K/uL  Auto Monocyte # : 0.34 K/uL  Auto Eosinophil # : 0.02 K/uL  Auto Basophil # : 0.03 K/uL  Auto Neutrophil % : 81.3 %  Auto Lymphocyte % : 12.6 %  Auto Monocyte % : 5.1 %  Auto Eosinophil % : 0.3 %  Auto Basophil % : 0.4 %    01-14    140  |  105  |  10  ----------------------------<  126<H>  3.8   |  25  |  0.68    Ca    9.4      14 Jan 2020 03:29    TPro  8.1  /  Alb  4.4  /  TBili  0.2  /  DBili  x   /  AST  24  /  ALT  30  /  AlkPhos  87  01-14    EKG: NSR

## 2020-01-14 NOTE — ED ADULT NURSE NOTE - OBJECTIVE STATEMENT
63 yo M w/ PMHx of __ presents to ED via EMS from nursing home c/o chest pain. Per EMS pt was recently seen at [facility] 2 days ago for same symptoms and d/c back to nursing home after negative cardiac workup. Upon exam pt agitated, yelling, combative with staff. Pt reports pain but uncooperative to answer questions or participate in assessment. MD at bedside. Per chart from nursing home pt takes seroquil and haldol at facility, unclear of time of last dose. Ativan and haldol ordered by MD Adler, administered as ordered. Pt placed on cardiac monitor, NSR noted. Other VSS. IV access unable to be established, MD aware US guided IV will be needed. Pt does not report other symptoms but does not answer questions when asked. No signs of acute distress noted. Safety and comfort maintained, no acute distress noted at this time. 63 yo M w/ PMHx of epilepsy, CAD, GERD, schizophrenia, HLD, depressive, BPH, DM, bipolar, dementia, HTN, TIA, ICH presents to ED via EMS from nursing home c/o chest pain. Per EMS pt was recently seen at [facility] 2 days ago for same symptoms and d/c back to nursing home after negative cardiac workup. Upon exam pt agitated, yelling, combative with staff. Pt reports pain but uncooperative to answer questions or participate in assessment. MD at bedside. Per chart from nursing home pt takes seroquil and haldol at facility, unclear of time of last dose. Ativan and haldol ordered by MD Adler, administered as ordered. Pt placed on cardiac monitor, NSR noted. Other VSS. IV access unable to be established, MD aware US guided IV will be needed. Pt does not report other symptoms but does not answer questions when asked. No signs of acute distress noted. Safety and comfort maintained, no acute distress noted at this time.

## 2020-01-14 NOTE — ED PROVIDER NOTE - PROGRESS NOTE DETAILS
AYLIN Adler MD: Pt agitated, wailing in room, trying to get up off stretcher. Difficult to redirect, appeared to be a harm to self and others. Not cooperating with medical staff. EKG demonstrated normal Qtc (435). Pt is on standing doses of seroquil and haldol decanoate at his facility. Ativan 2mg and haldol 5mg IM given for agitation. Will continue to monitor. Dr. Kevin Morgan, PGY-2: pt admitted to med on tele.

## 2020-01-14 NOTE — H&P ADULT - NSICDXPASTMEDICALHX_GEN_ALL_CORE_FT
PAST MEDICAL HISTORY:  History of BPH     History of TIAs     Hyperlipidemia     Schizophrenia     Type 2 diabetes mellitus

## 2020-01-14 NOTE — PROGRESS NOTE ADULT - ASSESSMENT
64 M        h/o     epilepsy, CAD, GERD,  schizophrenia , HLD , depression , BPH, DM2, dementia, HTN, TIA       presents    from  n home  with  cp  spoke  with  nursing home  dr libia lubin     tele/  card eval  troponin negative    echo   h/o  schizophrenia/  epilepsy.  on  celexa/ keppra/  seroquel/ oxcarbazepine   h/o cad/ htn,  on asa/ zocor/ imdur  DM 2,  follow  fs  Anemia,  cbc in am/ no  melna.  brbpr   dvt ppx    next of  kin. aunt/  Lia 64 M        h/o     epilepsy, CAD, GERD,  schizophrenia , HLD , depression , BPH, DM2, dementia, HTN, TIA       presents    from  n home  with  cp  spoke  with  nursing home  dr libia lubin     tele/  card eval  troponin negative    echo   h/o  schizophrenia/  epilepsy.  on  celexa/ keppra/  seroquel/ oxcarbazepine   h/o cad/ htn,  on asa/ zocor/  toprol  started  DM 2,  follow  fs  Anemia,  cbc in am/ no  melna.  brbpr   dvt ppx    next of  kin. aunt/  Lia

## 2020-01-14 NOTE — H&P ADULT - NSICDXFAMHXPERTINENTNEGATIVE_GEN_A_CORE_FT
Unable to obtain family history from patient Reviewed but unable to obtain family history from patient

## 2020-01-14 NOTE — PROGRESS NOTE ADULT - SUBJECTIVE AND OBJECTIVE BOX
no  cp/sob    REVIEW OF SYSTEMS:  GEN: no fever,    no chills  RESP: no SOB,   no cough  CVS: no chest pain,   no palpitations  GI: no abdominal pain,   no nausea,   no vomiting,   no constipation,   no diarrhea  : no dysuria,   no frequency  NEURO: no headache,   no dizziness  PSYCH: no depression,   not anxious  Derm : no rash    MEDICATIONS  (STANDING):  aspirin  chewable 81 milliGRAM(s) Oral daily  citalopram 10 milliGRAM(s) Oral daily  dextrose 5%. 1000 milliLiter(s) (50 mL/Hr) IV Continuous <Continuous>  dextrose 50% Injectable 12.5 Gram(s) IV Push once  dextrose 50% Injectable 25 Gram(s) IV Push once  dextrose 50% Injectable 25 Gram(s) IV Push once  enoxaparin Injectable 40 milliGRAM(s) SubCutaneous daily  insulin lispro (HumaLOG) corrective regimen sliding scale   SubCutaneous three times a day before meals  insulin lispro (HumaLOG) corrective regimen sliding scale   SubCutaneous at bedtime  isosorbide   mononitrate ER Tablet (IMDUR) 30 milliGRAM(s) Oral daily  levETIRAcetam 1000 milliGRAM(s) Oral two times a day  OXcarbazepine 600 milliGRAM(s) Oral two times a day  QUEtiapine 50 milliGRAM(s) Oral two times a day  simvastatin 40 milliGRAM(s) Oral at bedtime  tamsulosin 0.4 milliGRAM(s) Oral at bedtime    MEDICATIONS  (PRN):  dextrose 40% Gel 15 Gram(s) Oral once PRN Blood Glucose LESS THAN 70 milliGRAM(s)/deciliter  glucagon  Injectable 1 milliGRAM(s) IntraMuscular once PRN Glucose LESS THAN 70 milligrams/deciliter      Vital Signs Last 24 Hrs  T(C): 36.6 (14 Jan 2020 06:38), Max: 36.8 (14 Jan 2020 01:24)  T(F): 97.9 (14 Jan 2020 06:38), Max: 98.2 (14 Jan 2020 01:24)  HR: 81 (14 Jan 2020 06:38) (61 - 84)  BP: 133/86 (14 Jan 2020 06:38) (124/85 - 150/93)  BP(mean): --  RR: 16 (14 Jan 2020 06:38) (14 - 20)  SpO2: 98% (14 Jan 2020 06:38) (96% - 99%)  CAPILLARY BLOOD GLUCOSE        I&O's Summary      PHYSICAL EXAM:  HEAD:  Atraumatic, Normocephalic  NECK: Supple, No   JVD  CHEST/LUNG:   no     rales,     no,    rhonchi  HEART: Regular rate and rhythm;         murmur  ABDOMEN: Soft, Nontender, ;   EXTREMITIES:  no      edema  NEUROLOGY:  alert    LABS:                        9.3    6.72  )-----------( 308      ( 14 Jan 2020 03:29 )             31.2     01-14    140  |  105  |  10  ----------------------------<  126<H>  3.8   |  25  |  0.68    Ca    9.4      14 Jan 2020 03:29    TPro  8.1  /  Alb  4.4  /  TBili  0.2  /  DBili  x   /  AST  24  /  ALT  30  /  AlkPhos  87  01-14    PT/INR - ( 14 Jan 2020 03:29 )   PT: 13.8 sec;   INR: 1.20 ratio         PTT - ( 14 Jan 2020 03:29 )  PTT:31.7 sec                        Consultant(s) Notes Reviewed:      Care Discussed with Consultants/Other Providers:

## 2020-01-14 NOTE — H&P ADULT - NSHPREVIEWOFSYSTEMS_GEN_ALL_CORE
Unable to obtain review of systems from patient. Unable to answer questions. Reviewed but unable to obtain review of systems from patient. Unable to answer questions.

## 2020-01-14 NOTE — PROGRESS NOTE ADULT - PROBLEM SELECTOR PLAN 1
Recently admitted to [facility], where work-up was done.   - Attempted to reach next of kin, Aunt Lia, who was busy when called. Asked to call later.  - EKG shows NSR. HStrop wnl. Will f/u repeat.  - obtain records from [facility].  - Continue imdur and simvastatin.

## 2020-01-14 NOTE — ED PROVIDER NOTE - NS ED ROS FT
GENERAL: No fever or chills  EYES: No change in vision  HEENT: No trouble swallowing or speaking  CARDIAC: +chest pain  PULMONARY: No cough or SOB  GI: No abdominal pain, no nausea or no vomiting, no diarrhea or constipation  : No changes in urination  SKIN: No rashes  NEURO: No headache, no numbness  MSK: No joint pain  Otherwise as HPI or negative.

## 2020-01-14 NOTE — ED ADULT NURSE REASSESSMENT NOTE - NS ED NURSE REASSESS COMMENT FT1
While at pt bedside pt noted to go from normal sinus rhythm to SVT, before spontaneously converting back to NSR. Called remote tele tech to ask for print out of episode, tech was not aware of pts change in rhythm. MD Morgan and Vitor at bedside for pt reassessment. Pt resting comfortably after return to NSR. Repeat labs obtained and sent to lab.

## 2020-01-15 DIAGNOSIS — F25.9 SCHIZOAFFECTIVE DISORDER, UNSPECIFIED: ICD-10-CM

## 2020-01-15 LAB
CHOLEST SERPL-MCNC: 157 MG/DL — SIGNIFICANT CHANGE UP (ref 10–199)
GLUCOSE BLDC GLUCOMTR-MCNC: 101 MG/DL — HIGH (ref 70–99)
GLUCOSE BLDC GLUCOMTR-MCNC: 106 MG/DL — HIGH (ref 70–99)
GLUCOSE BLDC GLUCOMTR-MCNC: 118 MG/DL — HIGH (ref 70–99)
HBA1C BLD-MCNC: 6 % — HIGH (ref 4–5.6)
HCT VFR BLD CALC: 31.3 % — LOW (ref 39–50)
HCV AB S/CO SERPL IA: 0.12 S/CO — SIGNIFICANT CHANGE UP (ref 0–0.99)
HCV AB SERPL-IMP: SIGNIFICANT CHANGE UP
HDLC SERPL-MCNC: 42 MG/DL — SIGNIFICANT CHANGE UP
HGB BLD-MCNC: 9 G/DL — LOW (ref 13–17)
LIPID PNL WITH DIRECT LDL SERPL: 98 MG/DL — SIGNIFICANT CHANGE UP
MCHC RBC-ENTMCNC: 22.3 PG — LOW (ref 27–34)
MCHC RBC-ENTMCNC: 28.8 GM/DL — LOW (ref 32–36)
MCV RBC AUTO: 77.7 FL — LOW (ref 80–100)
PLATELET # BLD AUTO: 314 K/UL — SIGNIFICANT CHANGE UP (ref 150–400)
RBC # BLD: 4.03 M/UL — LOW (ref 4.2–5.8)
RBC # FLD: 16.7 % — HIGH (ref 10.3–14.5)
TOTAL CHOLESTEROL/HDL RATIO MEASUREMENT: 3.7 RATIO — SIGNIFICANT CHANGE UP (ref 3.4–9.6)
TRIGL SERPL-MCNC: 83 MG/DL — SIGNIFICANT CHANGE UP (ref 10–149)
TSH SERPL-MCNC: 1.07 UIU/ML — SIGNIFICANT CHANGE UP (ref 0.27–4.2)
TSH SERPL-MCNC: 1.34 UIU/ML — SIGNIFICANT CHANGE UP (ref 0.27–4.2)
WBC # BLD: 4.49 K/UL — SIGNIFICANT CHANGE UP (ref 3.8–10.5)
WBC # FLD AUTO: 4.49 K/UL — SIGNIFICANT CHANGE UP (ref 3.8–10.5)

## 2020-01-15 PROCEDURE — 99223 1ST HOSP IP/OBS HIGH 75: CPT

## 2020-01-15 PROCEDURE — 99232 SBSQ HOSP IP/OBS MODERATE 35: CPT

## 2020-01-15 RX ORDER — SIMVASTATIN 20 MG/1
1 TABLET, FILM COATED ORAL
Qty: 0 | Refills: 0 | DISCHARGE

## 2020-01-15 RX ORDER — CITALOPRAM 10 MG/1
1 TABLET, FILM COATED ORAL
Qty: 0 | Refills: 0 | DISCHARGE

## 2020-01-15 RX ORDER — OXCARBAZEPINE 300 MG/1
1 TABLET, FILM COATED ORAL
Qty: 0 | Refills: 0 | DISCHARGE

## 2020-01-15 RX ORDER — HALOPERIDOL DECANOATE 100 MG/ML
1 INJECTION INTRAMUSCULAR
Qty: 0 | Refills: 0 | DISCHARGE

## 2020-01-15 RX ORDER — ESCITALOPRAM OXALATE 10 MG/1
5 TABLET, FILM COATED ORAL DAILY
Refills: 0 | Status: DISCONTINUED | OUTPATIENT
Start: 2020-01-16 | End: 2020-01-16

## 2020-01-15 RX ORDER — FAMOTIDINE 10 MG/ML
1 INJECTION INTRAVENOUS
Qty: 0 | Refills: 0 | DISCHARGE

## 2020-01-15 RX ORDER — HALOPERIDOL DECANOATE 100 MG/ML
0.5 INJECTION INTRAMUSCULAR
Qty: 0 | Refills: 0 | DISCHARGE

## 2020-01-15 RX ORDER — LEVETIRACETAM 250 MG/1
1 TABLET, FILM COATED ORAL
Qty: 0 | Refills: 0 | DISCHARGE

## 2020-01-15 RX ORDER — TAMSULOSIN HYDROCHLORIDE 0.4 MG/1
1 CAPSULE ORAL
Qty: 0 | Refills: 0 | DISCHARGE

## 2020-01-15 RX ORDER — ASPIRIN/CALCIUM CARB/MAGNESIUM 324 MG
1 TABLET ORAL
Qty: 0 | Refills: 0 | DISCHARGE

## 2020-01-15 RX ORDER — QUETIAPINE FUMARATE 200 MG/1
50 TABLET, FILM COATED ORAL
Refills: 0 | Status: DISCONTINUED | OUTPATIENT
Start: 2020-01-15 | End: 2020-01-16

## 2020-01-15 RX ORDER — QUETIAPINE FUMARATE 200 MG/1
100 TABLET, FILM COATED ORAL
Refills: 0 | Status: DISCONTINUED | OUTPATIENT
Start: 2020-01-15 | End: 2020-01-16

## 2020-01-15 RX ADMIN — TAMSULOSIN HYDROCHLORIDE 0.4 MILLIGRAM(S): 0.4 CAPSULE ORAL at 23:12

## 2020-01-15 RX ADMIN — QUETIAPINE FUMARATE 50 MILLIGRAM(S): 200 TABLET, FILM COATED ORAL at 06:30

## 2020-01-15 RX ADMIN — OXCARBAZEPINE 600 MILLIGRAM(S): 300 TABLET, FILM COATED ORAL at 06:30

## 2020-01-15 RX ADMIN — OXCARBAZEPINE 600 MILLIGRAM(S): 300 TABLET, FILM COATED ORAL at 17:58

## 2020-01-15 RX ADMIN — QUETIAPINE FUMARATE 100 MILLIGRAM(S): 200 TABLET, FILM COATED ORAL at 23:11

## 2020-01-15 RX ADMIN — LEVETIRACETAM 1000 MILLIGRAM(S): 250 TABLET, FILM COATED ORAL at 17:58

## 2020-01-15 RX ADMIN — ENOXAPARIN SODIUM 40 MILLIGRAM(S): 100 INJECTION SUBCUTANEOUS at 12:21

## 2020-01-15 RX ADMIN — QUETIAPINE FUMARATE 50 MILLIGRAM(S): 200 TABLET, FILM COATED ORAL at 23:11

## 2020-01-15 RX ADMIN — LEVETIRACETAM 1000 MILLIGRAM(S): 250 TABLET, FILM COATED ORAL at 06:30

## 2020-01-15 RX ADMIN — Medication 25 MILLIGRAM(S): at 06:30

## 2020-01-15 RX ADMIN — SIMVASTATIN 40 MILLIGRAM(S): 20 TABLET, FILM COATED ORAL at 23:12

## 2020-01-15 RX ADMIN — Medication 81 MILLIGRAM(S): at 12:21

## 2020-01-15 RX ADMIN — CITALOPRAM 10 MILLIGRAM(S): 10 TABLET, FILM COATED ORAL at 12:21

## 2020-01-15 NOTE — PROGRESS NOTE ADULT - SUBJECTIVE AND OBJECTIVE BOX
CARDIOLOGY     PROGRESS  NOTE   ________________________________________________    CHIEF COMPLAINT:Patient is a 64y old  Male who presents with a chief complaint of CP (14 Jan 2020 09:41)  no  complain.  	  REVIEW OF SYSTEMS:  CONSTITUTIONAL: No fever, weight loss, or fatigue  EYES: No eye pain, visual disturbances, or discharge  ENT:  No difficulty hearing, tinnitus, vertigo; No sinus or throat pain  NECK: No pain or stiffness  RESPIRATORY: No cough, wheezing, chills or hemoptysis; No Shortness of Breath  CARDIOVASCULAR: +chest pain,no  palpitations, passing out, dizziness, or leg swelling  GASTROINTESTINAL: No abdominal or epigastric pain. No nausea, vomiting, or hematemesis; No diarrhea or constipation. No melena or hematochezia.  GENITOURINARY: No dysuria, frequency, hematuria, or incontinence  NEUROLOGICAL: No headaches, memory loss, loss of strength, numbness, or tremors  SKIN: No itching, burning, rashes, or lesions   LYMPH Nodes: No enlarged glands  ENDOCRINE: No heat or cold intolerance; No hair loss  MUSCULOSKELETAL: No joint pain or swelling; No muscle, back, or extremity pain  PSYCHIATRIC: No depression, anxiety, mood swings, or difficulty sleeping  HEME/LYMPH: No easy bruising, or bleeding gums  ALLERGY AND IMMUNOLOGIC: No hives or eczema	    [ ] All others negative	  [ ] Unable to obtain    PHYSICAL EXAM:  T(C): 36.9 (01-15-20 @ 06:01), Max: 36.9 (01-15-20 @ 06:01)  HR: 74 (01-15-20 @ 06:01) (68 - 88)  BP: 127/79 (01-15-20 @ 06:01) (116/74 - 167/90)  RR: 18 (01-15-20 @ 06:01) (16 - 18)  SpO2: 99% (01-15-20 @ 06:01) (96% - 100%)  Wt(kg): --  I&O's Summary    14 Jan 2020 07:01  -  15 Betito 2020 07:00  --------------------------------------------------------  IN: 240 mL / OUT: 200 mL / NET: 40 mL    15 Betito 2020 07:01  -  15 Betito 2020 09:12  --------------------------------------------------------  IN: 240 mL / OUT: 0 mL / NET: 240 mL        Appearance: Normal	  HEENT:   Normal oral mucosa, PERRL, EOMI	  Lymphatic: No lymphadenopathy  Cardiovascular: Normal S1 S2, No JVD, + murmurs, No edema  Respiratory: Lungs clear to auscultation	  Gastrointestinal:  Soft, Non-tender, + BS	  Skin: No rashes, No ecchymoses, No cyanosis	  Neurologic: Non-focal  Extremities: Normal range of motion, No clubbing, cyanosis or edema  Vascular: Peripheral pulses palpable 2+ bilaterally    MEDICATIONS  (STANDING):  aspirin  chewable 81 milliGRAM(s) Oral daily  citalopram 10 milliGRAM(s) Oral daily  dextrose 5%. 1000 milliLiter(s) (50 mL/Hr) IV Continuous <Continuous>  dextrose 50% Injectable 12.5 Gram(s) IV Push once  dextrose 50% Injectable 25 Gram(s) IV Push once  dextrose 50% Injectable 25 Gram(s) IV Push once  enoxaparin Injectable 40 milliGRAM(s) SubCutaneous daily  insulin lispro (HumaLOG) corrective regimen sliding scale   SubCutaneous three times a day before meals  insulin lispro (HumaLOG) corrective regimen sliding scale   SubCutaneous at bedtime  levETIRAcetam 1000 milliGRAM(s) Oral two times a day  metoprolol succinate ER 25 milliGRAM(s) Oral daily  OXcarbazepine 600 milliGRAM(s) Oral two times a day  QUEtiapine 50 milliGRAM(s) Oral two times a day  simvastatin 40 milliGRAM(s) Oral at bedtime  tamsulosin 0.4 milliGRAM(s) Oral at bedtime      TELEMETRY: 	    ECG:  	  RADIOLOGY:  OTHER: 	  	  LABS:	 	    CARDIAC MARKERS:                                8.9    5.85  )-----------( 264      ( 14 Jan 2020 09:32 )             31.2     01-14    140  |  105  |  10  ----------------------------<  126<H>  3.8   |  25  |  0.68    Ca    9.4      14 Jan 2020 03:29    TPro  8.1  /  Alb  4.4  /  TBili  0.2  /  DBili  x   /  AST  24  /  ALT  30  /  AlkPhos  87  01-14    proBNP:   Lipid Profile: Cholesterol 157  LDL 98  HDL 42  TG 83    HgA1c: Hemoglobin A1C, Whole Blood: 6.0 % (01-14 @ 23:23)    TSH: Thyroid Stimulating Hormone, Serum: 1.34 uIU/mL (01-15 @ 00:08)    PT/INR - ( 14 Jan 2020 09:29 )   PT: 13.2 sec;   INR: 1.14 ratio         PTT - ( 14 Jan 2020 09:29 )  PTT:29.5 sec  < from: Transthoracic Echocardiogram (01.14.20 @ 15:30) >  1. Normal left ventricular systolic function. No segmental  wall motion abnormalities.  2. Normal right ventricular size and function.  3. Estimated right ventricular systolic pressure equals 42  mm Hg, assuming right atrial pressure equals 8 mm Hg,  consistent with mild pulmonary hypertension.          Assessment and plan  ---------------------------  64 M with epilepsy, CAD, GERD, schizophrenia, HLD, depression, BPH, DM2, dementia, HTN, TIA presents to ED from nursing home for CP. Patient was admitted to [facility] 2 days ago for same symptoms with negative cardiac work-up and apparently discharged to nursing home, per EMS report. On initial presentation to ED, patient was agitated and yelling at staff. He was given haldol and ativan and is now slow to respond. Nursing home records include diagnoses and home medications, but not much else. Patient is prescribed 125 ug haldol q month.    Patient unable to answer questions currently: very slow to respond. Currently A+Ox1. Cannot elaborate on CP. Had a run on SVT on telemetry, which self-resolved. Vitals are otherwise stable. (14 Jan 2020 05:42)   pt with known  hx of cad with chest pain  asa daily  beta blocker  need to get info from [facility]  dvt prophylaxis  repeat ecg  echo noted  pt needs stress test ?  cardiac ct nancy not sure pt can consent  psych consult

## 2020-01-15 NOTE — BEHAVIORAL HEALTH ASSESSMENT NOTE - SUMMARY
64M single, domiciled at [facility], unemployed, with PPH schizophrenia and dementia, sees OP psychiatrist at NH, currently taking Celexa, Seroquel, Haldol dec, previous psychiatric admissions (?pt could not elaborate on most recent, NH staff say none in past 1-2 yrs), no substance abuse, with PMH significant for seizure d/o, CAD, HLD, h/o TIAs, DM II, anemia, GERD, h/o BPH, admitted for chest pain, previously discharged from [facility] for similar symptoms with negative cardiac workup. Psychiatry consulted for episode of agitation yesterday, received Haldol PRN with good effect.  Currently denies all psychiatric sx apart from intermittent sad mood, no SI/HI, currently calm and cooperative.  NH staff report chronic aggression/agitation when pt does not get his way.

## 2020-01-15 NOTE — BEHAVIORAL HEALTH ASSESSMENT NOTE - HPI (INCLUDE ILLNESS QUALITY, SEVERITY, DURATION, TIMING, CONTEXT, MODIFYING FACTORS, ASSOCIATED SIGNS AND SYMPTOMS)
64M single, domiciled at [facility], unemployed, with PPH schizophrenia and dementia, sees OP psychiatrist at NH, currently taking Celexa, Seroquel, Haldol dec, previous psychiatric admissions (?pt could not elaborate on most recent, NH staff say none in past 1-2 yrs), no substance abuse, with PMH significant for seizure d/o, CAD, HLD, h/o TIAs, DM II, anemia, GERD, h/o BPH, admitted for chest pain, previously discharged from [facility] for similar symptoms with negative cardiac workup. Psychiatry consulted for episode of agitation yesterday, received Haldol PRN with good effect.    On evaluation, pt is pleasant and cooperative. Pt was able to answer all questions to the best of his ability, mildly confused regarding details of hx and admission. Pt is A&O x1 (to person), thinks we are currently in the nursing home, knows month but states year is 2010, cannot name the president unless given choices.  He states he feels "relaxed" and that his chest pain has improved; he just feels a bit “sore”. He endorses feelings of intermittent sadness/depression, and mentions that he is depressed because he is unable to live on his own. Pt states he has a good appetite and has been sleeping well (about 6-12 hours a night). He denies any current feelings of worry, delusions or paranoia, alcohol, tobacco (last cigarette 20 years ago), or drug use. He also denies any current suicidal ideation- states last time he felt like life was not worth living was 12 years ago, denies any suicide attempts or homicidal ideations.  Yesterday pt received Haldol 5mg PRN x1 for agitation with good effect; today pt has been calm and cooperative per enhanced obs staff at bedside, 1:1 was discontinued.    Collateral received from Gisella Naqvi nursing supervisor at [facility].  She reports pt is chronically aggressive, fixated on chest pain and wants to go to [facility] every day to be evaluated. When he is told he cannot go, he can become physically aggressive and agitatied, and has had episodes of destructive behavior, such as throwing a computer last week. After this episode, was seen in ED at [facility] and cleared.  She states that patient is compliant with medications, but can become paranoid over medications and will ask to review the med list.  Is compliant with Haldol dec, receives monthly IM, also sees the psychiatrist at NH regularly (psychiatrist comes every Thursday) and was due to see the psychiatrist this week.  States pt does not make suicidal statements.

## 2020-01-15 NOTE — CONSULT NOTE ADULT - ASSESSMENT
anemia  chest pain    no overt gi bleed  hgb stable   diet as tolerated   no objection to dc planning
64 M with epilepsy, CAD, GERD, schizophrenia, HLD, depression, BPH, DM2, dementia, HTN, TIA presents to ED from nursing home for CP. Patient was admitted to [facility] 2 days ago for same symptoms with negative cardiac work-up and apparently discharged to nursing home, per EMS report. On initial presentation to ED, patient was agitated and yelling at staff. He was given haldol and ativan and is now slow to respond. Nursing home records include diagnoses and home medications, but not much else. Patient is prescribed 125 ug haldol q month.    Patient unable to answer questions currently: very slow to respond. Currently A+Ox1. Cannot elaborate on CP. Had a run on SVT on telemetry, which self-resolved. Vitals are otherwise stable. (14 Jan 2020 05:42)   pt with known  hx of cad with chest pain  asa daily  beta blocker  need to get info from [facility]  dvt prophylaxis  repeat ecg

## 2020-01-15 NOTE — BEHAVIORAL HEALTH ASSESSMENT NOTE - NSBHCHARTREVIEWLAB_PSY_A_CORE FT
9.0    4.49  )-----------( 314      ( 15 Betito 2020 09:04 )             31.3     01-14    140  |  105  |  10  ----------------------------<  126<H>  3.8   |  25  |  0.68    Ca    9.4      14 Jan 2020 03:29    TPro  8.1  /  Alb  4.4  /  TBili  0.2  /  DBili  x   /  AST  24  /  ALT  30  /  AlkPhos  87  01-14

## 2020-01-15 NOTE — PATIENT PROFILE ADULT - FUNCTIONAL SCREEN CURRENT LEVEL: SWALLOWING (IF SCORE 2 OR MORE FOR ANY ITEM, CONSULT REHAB SERVICES), MLM)
Plan: Patient to call when flares for evaluation Detail Level: Simple 0 = swallows foods/liquids without difficulty

## 2020-01-15 NOTE — PROGRESS NOTE ADULT - ASSESSMENT
64 M        h/o     epilepsy, CAD, GERD,  schizophrenia , HLD , depression , BPH, DM2, dementia, HTN, TIA       presents    from  n home  with  cp  spoke  with  nursing home  dr libia lubin     tele/  card eval  troponin negative       h/o  schizophrenia/  epilepsy.  on  celexa/ keppra/  seroquel/ oxcarbazepine   h/o cad/ htn,  on asa/ zocor/  toprol  started  DM 2,  follow  fs  Anemia,  cbc in am/ no  melna.  brbpr/   gi eval   dvt ppx  Echo, normal  ef/  w/p  per  card  if  pt gets  agitated,  then will need  psych  eval    next of  kin. aunt/  Lia 64 M        h/o     epilepsy, CAD, GERD,  schizophrenia , HLD , depression , BPH, DM2, dementia, HTN, TIA       presents    from  n home  with  cp/  atypical/ pt is unreliable  historian  spoke  with  nursing home  dr libia lubin    troponin negative       h/o  schizophrenia/  epilepsy.  on  celexa/ keppra/  seroquel/ oxcarbazepine   h/o cad/ htn,  on asa/ zocor/  toprol  started  DM 2,  follow  fs  Anemia,  cbc in am/ no  melna.  brbpr/   gi eval   dvt ppx  Echo, normal  ef/  w/p  per  card  ipt with   agitation ,   needs  psych eval      next of  kin. aunt/  Lia

## 2020-01-15 NOTE — BEHAVIORAL HEALTH ASSESSMENT NOTE - RISK ASSESSMENT
Low Acute Suicide Risk Risk factors:  psychotic illness, h/o chronic agitation/aggression, h/o previous psych admissions, diminished self-care/disability requiring NH level of care    Protective factors: no current SIIP/HIIP, no h/o SA/SIB, no access to weapons, no active substance abuse,  domiciled in NH, compliant with treatment    Overall, pt is at chronically elevated risk mitigated by mult protective factors; does not meet criteria for psychiatric admission.

## 2020-01-15 NOTE — BEHAVIORAL HEALTH ASSESSMENT NOTE - NSBHCHARTREVIEWVS_PSY_A_CORE FT
T(C): 36.9 (01-15-20 @ 11:16), Max: 36.9 (01-15-20 @ 06:01)  HR: 69 (01-15-20 @ 12:31) (67 - 88)  BP: 124/75 (01-15-20 @ 12:31) (108/69 - 167/90)  RR: 18 (01-15-20 @ 12:31) (16 - 18)  SpO2: 97% (01-15-20 @ 12:31) (96% - 100%)

## 2020-01-15 NOTE — BEHAVIORAL HEALTH ASSESSMENT NOTE - OTHER
Gisella Naqvi RN director at [facility] Resides in nursing home concrete see above supportive NH staff

## 2020-01-15 NOTE — BEHAVIORAL HEALTH ASSESSMENT NOTE - NSBHCHARTREVIEWIMAGING_PSY_A_CORE FT
Transthoracic Echocardiogram (01.14.20 @ 15:30)   Conclusions:  1. Normal left ventricular systolic function. No segmental  wall motion abnormalities.  2. Normal right ventricular size and function.  3. Estimated right ventricular systolic pressure equals 42  mm Hg, assuming right atrial pressure equals 8 mm Hg,  consistent with mild pulmonary hypertension.    Xray Chest 1 View- PORTABLE-Urgent (01.14.20 @ 02:47)   IMPRESSION:   Clear lungs.

## 2020-01-15 NOTE — BEHAVIORAL HEALTH ASSESSMENT NOTE - NSBHCONSULTMEDS_PSY_A_CORE FT
1. D/c Celexa, start Lexapro 5mg PO daily    2. C/w Haldol dec 125mg IM qmonthly (next due 2/3/19)    3. Increase Seroquel to 50mg PO daily and 100mg PO qHS    4. Haldol 5mg PO/IV q6h PRN agitation, monitor EKG for QTc<500

## 2020-01-15 NOTE — CONSULT NOTE ADULT - SUBJECTIVE AND OBJECTIVE BOX
[facility]  Raman Joseph PA-C  [address] [place] [id number]  [phone]      Chief Complaint:  Patient is a 64y old  Male who presents with a chief complaint of CP (15 Betito 2020 09:29)      HPI: 64 M with epilepsy, CAD, GERD, schizophrenia, HLD, depression, BPH, DM2, dementia, HTN, TIA presents to ED from nursing home for CP. Patient was admitted to [facility] 2 days ago for same symptoms with negative cardiac work-up and apparently discharged to nursing home, per EMS report. On initial presentation to ED, patient was agitated and yelling at staff. He was given haldol and ativan and is now slow to respond. Nursing home records include diagnoses and home medications, but not much else. Patient is prescribed 125 ug haldol q month.    Allergies:  Allergy Status Unknown      Medications:  aspirin  chewable 81 milliGRAM(s) Oral daily  citalopram 10 milliGRAM(s) Oral daily  dextrose 40% Gel 15 Gram(s) Oral once PRN  dextrose 5%. 1000 milliLiter(s) IV Continuous <Continuous>  dextrose 50% Injectable 12.5 Gram(s) IV Push once  dextrose 50% Injectable 25 Gram(s) IV Push once  dextrose 50% Injectable 25 Gram(s) IV Push once  enoxaparin Injectable 40 milliGRAM(s) SubCutaneous daily  glucagon  Injectable 1 milliGRAM(s) IntraMuscular once PRN  insulin lispro (HumaLOG) corrective regimen sliding scale   SubCutaneous three times a day before meals  insulin lispro (HumaLOG) corrective regimen sliding scale   SubCutaneous at bedtime  levETIRAcetam 1000 milliGRAM(s) Oral two times a day  metoprolol succinate ER 25 milliGRAM(s) Oral daily  OXcarbazepine 600 milliGRAM(s) Oral two times a day  QUEtiapine 50 milliGRAM(s) Oral two times a day  simvastatin 40 milliGRAM(s) Oral at bedtime  tamsulosin 0.4 milliGRAM(s) Oral at bedtime      PMHX/PSHX:  History of TIAs  History of BPH  Hyperlipidemia  Type 2 diabetes mellitus  Schizophrenia  No significant past surgical history      Family history:  No pertinent family history in first degree relatives      Social History:     ROS:     General:  No wt loss, fevers, chills, night sweats, fatigue,   Eyes:  Good vision, no reported pain  ENT:  No sore throat, pain, runny nose, dysphagia  CV:  No pain, palpitations, hypo/hypertension  Resp:  No dyspnea, cough, tachypnea, wheezing  GI:  No pain, No nausea, No vomiting, No diarrhea, No constipation, No weight loss, No fever, No pruritis, No rectal bleeding, No tarry stools, No dysphagia,  :  No pain, bleeding, incontinence, nocturia  Muscle:  No pain, weakness  Neuro:  No weakness, tingling, memory problems  Psych:  No fatigue, insomnia, mood problems, depression  Endocrine:  No polyuria, polydipsia, cold/heat intolerance  Heme:  No petechiae, ecchymosis, easy bruisability  Skin:  No rash, tattoos, scars, edema      PHYSICAL EXAM:   Vital Signs:  Vital Signs Last 24 Hrs  T(C): 36.9 (15 Betito 2020 11:16), Max: 36.9 (15 Betito 2020 06:01)  T(F): 98.5 (15 Betito 2020 11:16), Max: 98.5 (15 Betito 2020 06:01)  HR: 69 (15 Betito 2020 12:31) (67 - 88)  BP: 124/75 (15 Betito 2020 12:31) (108/69 - 167/90)  BP(mean): --  RR: 18 (15 Betito 2020 12:31) (16 - 18)  SpO2: 97% (15 Betito 2020 12:31) (96% - 100%)  Daily     Daily     GENERAL:  Appears stated age, well-groomed, well-nourished, no distress  HEENT:  NC/AT,  conjunctivae clear and pink, no thyromegaly, nodules, adenopathy, no JVD, sclera -anicteric  CHEST:  Full & symmetric excursion, no increased effort, breath sounds clear  HEART:  Regular rhythm, S1, S2, no murmur/rub/S3/S4, no abdominal bruit, no edema  ABDOMEN:  Soft, non-tender, non-distended, normoactive bowel sounds,  no masses ,no hepato-splenomegaly, no signs of chronic liver disease  EXTEREMITIES:  no cyanosis,clubbing or edema  SKIN:  No rash/erythema/ecchymoses/petechiae/wounds/abscess/warm/dry  NEURO:  Alert, oriented, no asterixis, no tremor, no encephalopathy    LABS:                        9.0    4.49  )-----------( 314      ( 15 Betito 2020 09:04 )             31.3     01-14    140  |  105  |  10  ----------------------------<  126<H>  3.8   |  25  |  0.68    Ca    9.4      14 Jan 2020 03:29    TPro  8.1  /  Alb  4.4  /  TBili  0.2  /  DBili  x   /  AST  24  /  ALT  30  /  AlkPhos  87  01-14    LIVER FUNCTIONS - ( 14 Jan 2020 03:29 )  Alb: 4.4 g/dL / Pro: 8.1 g/dL / ALK PHOS: 87 U/L / ALT: 30 U/L / AST: 24 U/L / GGT: x           PT/INR - ( 14 Jan 2020 09:29 )   PT: 13.2 sec;   INR: 1.14 ratio         PTT - ( 14 Jan 2020 09:29 )  PTT:29.5 sec        Imaging:

## 2020-01-15 NOTE — BEHAVIORAL HEALTH ASSESSMENT NOTE - ATTENTION / CONCENTRATION
11/19/19 1005 spoke with pcp office and updated them on dc diagnosis, labs (anc 1100) and US results. will follow up as needed. will give report to the UR to fax to the pcp. Laurence Cannon MS, RN, CPNP-PC Impaired

## 2020-01-15 NOTE — BEHAVIORAL HEALTH ASSESSMENT NOTE - OTHER PAST PSYCHIATRIC HISTORY (INCLUDE DETAILS REGARDING ONSET, COURSE OF ILLNESS, INPATIENT/OUTPATIENT TREATMENT)
long h/o schizoaffective disorder, dementia, no inpt psych admissions in past 1-2 years per NH staff, no previous SA, sees NH psychiatrist

## 2020-01-15 NOTE — PROGRESS NOTE ADULT - SUBJECTIVE AND OBJECTIVE BOX
no  cp/sob    REVIEW OF SYSTEMS:  GEN: no fever,    no chills  RESP: no SOB,   no cough  CVS: no chest pain,   no palpitations  GI: no abdominal pain,   no nausea,   no vomiting,   no constipation,   no diarrhea  : no dysuria,   no frequency  NEURO: no headache,   no dizziness  PSYCH: no depression,   not anxious  Derm : no rash    MEDICATIONS  (STANDING):  aspirin  chewable 81 milliGRAM(s) Oral daily  citalopram 10 milliGRAM(s) Oral daily  dextrose 5%. 1000 milliLiter(s) (50 mL/Hr) IV Continuous <Continuous>  dextrose 50% Injectable 12.5 Gram(s) IV Push once  dextrose 50% Injectable 25 Gram(s) IV Push once  dextrose 50% Injectable 25 Gram(s) IV Push once  enoxaparin Injectable 40 milliGRAM(s) SubCutaneous daily  insulin lispro (HumaLOG) corrective regimen sliding scale   SubCutaneous three times a day before meals  insulin lispro (HumaLOG) corrective regimen sliding scale   SubCutaneous at bedtime  levETIRAcetam 1000 milliGRAM(s) Oral two times a day  metoprolol succinate ER 25 milliGRAM(s) Oral daily  OXcarbazepine 600 milliGRAM(s) Oral two times a day  QUEtiapine 50 milliGRAM(s) Oral two times a day  simvastatin 40 milliGRAM(s) Oral at bedtime  tamsulosin 0.4 milliGRAM(s) Oral at bedtime    MEDICATIONS  (PRN):  dextrose 40% Gel 15 Gram(s) Oral once PRN Blood Glucose LESS THAN 70 milliGRAM(s)/deciliter  glucagon  Injectable 1 milliGRAM(s) IntraMuscular once PRN Glucose LESS THAN 70 milligrams/deciliter      Vital Signs Last 24 Hrs  T(C): 36.9 (15 Betito 2020 06:01), Max: 36.9 (15 Betito 2020 06:01)  T(F): 98.5 (15 Betito 2020 06:01), Max: 98.5 (15 Betito 2020 06:01)  HR: 74 (15 Betito 2020 06:01) (68 - 88)  BP: 127/79 (15 Betito 2020 06:01) (116/74 - 167/90)  BP(mean): --  RR: 18 (15 Betito 2020 06:01) (16 - 18)  SpO2: 99% (15 Betito 2020 06:01) (96% - 100%)  CAPILLARY BLOOD GLUCOSE      POCT Blood Glucose.: 101 mg/dL (15 Betito 2020 07:51)  POCT Blood Glucose.: 115 mg/dL (14 Jan 2020 20:58)  POCT Blood Glucose.: 103 mg/dL (14 Jan 2020 17:57)  POCT Blood Glucose.: 118 mg/dL (14 Jan 2020 12:04)    I&O's Summary    14 Jan 2020 07:01  -  15 Betito 2020 07:00  --------------------------------------------------------  IN: 240 mL / OUT: 200 mL / NET: 40 mL    15 Betito 2020 07:01  -  15 Betito 2020 09:29  --------------------------------------------------------  IN: 240 mL / OUT: 0 mL / NET: 240 mL        PHYSICAL EXAM:  HEAD:  Atraumatic, Normocephalic  NECK: Supple, No   JVD  CHEST/LUNG:   no     rales,     no,    rhonchi  HEART: Regular rate and rhythm;         murmur  ABDOMEN: Soft, Nontender, ;   EXTREMITIES:    no    edema  NEUROLOGY:  alert    LABS:                        9.0    4.49  )-----------( 314      ( 15 Betito 2020 09:04 )             31.3     01-14    140  |  105  |  10  ----------------------------<  126<H>  3.8   |  25  |  0.68    Ca    9.4      14 Jan 2020 03:29    TPro  8.1  /  Alb  4.4  /  TBili  0.2  /  DBili  x   /  AST  24  /  ALT  30  /  AlkPhos  87  01-14    PT/INR - ( 14 Jan 2020 09:29 )   PT: 13.2 sec;   INR: 1.14 ratio         PTT - ( 14 Jan 2020 09:29 )  PTT:29.5 sec              Hemoglobin A1C, Whole Blood: 6.0 % (01-14 @ 23:23)    Thyroid Stimulating Hormone, Serum: 1.34 uIU/mL (01-15 @ 00:08)          Consultant(s) Notes Reviewed:      Care Discussed with Consultants/Other Providers: no  cp/sob  agitation    REVIEW OF SYSTEMS:  GEN: no fever,    no chills  RESP: no SOB,   no cough  CVS: no chest pain,   no palpitations  GI: no abdominal pain,   no nausea,   no vomiting,   no constipation,   no diarrhea  : no dysuria,   no frequency  NEURO: no headache,   no dizziness  PSYCH: no depression,   not anxious  Derm : no rash    MEDICATIONS  (STANDING):  aspirin  chewable 81 milliGRAM(s) Oral daily  citalopram 10 milliGRAM(s) Oral daily  dextrose 5%. 1000 milliLiter(s) (50 mL/Hr) IV Continuous <Continuous>  dextrose 50% Injectable 12.5 Gram(s) IV Push once  dextrose 50% Injectable 25 Gram(s) IV Push once  dextrose 50% Injectable 25 Gram(s) IV Push once  enoxaparin Injectable 40 milliGRAM(s) SubCutaneous daily  insulin lispro (HumaLOG) corrective regimen sliding scale   SubCutaneous three times a day before meals  insulin lispro (HumaLOG) corrective regimen sliding scale   SubCutaneous at bedtime  levETIRAcetam 1000 milliGRAM(s) Oral two times a day  metoprolol succinate ER 25 milliGRAM(s) Oral daily  OXcarbazepine 600 milliGRAM(s) Oral two times a day  QUEtiapine 50 milliGRAM(s) Oral two times a day  simvastatin 40 milliGRAM(s) Oral at bedtime  tamsulosin 0.4 milliGRAM(s) Oral at bedtime    MEDICATIONS  (PRN):  dextrose 40% Gel 15 Gram(s) Oral once PRN Blood Glucose LESS THAN 70 milliGRAM(s)/deciliter  glucagon  Injectable 1 milliGRAM(s) IntraMuscular once PRN Glucose LESS THAN 70 milligrams/deciliter      Vital Signs Last 24 Hrs  T(C): 36.9 (15 Betito 2020 06:01), Max: 36.9 (15 Betito 2020 06:01)  T(F): 98.5 (15 Betito 2020 06:01), Max: 98.5 (15 Betito 2020 06:01)  HR: 74 (15 Betito 2020 06:01) (68 - 88)  BP: 127/79 (15 Betito 2020 06:01) (116/74 - 167/90)  BP(mean): --  RR: 18 (15 Betito 2020 06:01) (16 - 18)  SpO2: 99% (15 Betito 2020 06:01) (96% - 100%)  CAPILLARY BLOOD GLUCOSE      POCT Blood Glucose.: 101 mg/dL (15 Betito 2020 07:51)  POCT Blood Glucose.: 115 mg/dL (14 Jan 2020 20:58)  POCT Blood Glucose.: 103 mg/dL (14 Jan 2020 17:57)  POCT Blood Glucose.: 118 mg/dL (14 Jan 2020 12:04)    I&O's Summary    14 Jan 2020 07:01  -  15 Betito 2020 07:00  --------------------------------------------------------  IN: 240 mL / OUT: 200 mL / NET: 40 mL    15 Betito 2020 07:01  -  15 Betito 2020 09:29  --------------------------------------------------------  IN: 240 mL / OUT: 0 mL / NET: 240 mL        PHYSICAL EXAM:  HEAD:  Atraumatic, Normocephalic  NECK: Supple, No   JVD  CHEST/LUNG:   no     rales,     no,    rhonchi  HEART: Regular rate and rhythm;         murmur  ABDOMEN: Soft, Nontender, ;   EXTREMITIES:    no    edema  NEUROLOGY:  alert    LABS:                        9.0    4.49  )-----------( 314      ( 15 Betito 2020 09:04 )             31.3     01-14    140  |  105  |  10  ----------------------------<  126<H>  3.8   |  25  |  0.68    Ca    9.4      14 Jan 2020 03:29    TPro  8.1  /  Alb  4.4  /  TBili  0.2  /  DBili  x   /  AST  24  /  ALT  30  /  AlkPhos  87  01-14    PT/INR - ( 14 Jan 2020 09:29 )   PT: 13.2 sec;   INR: 1.14 ratio         PTT - ( 14 Jan 2020 09:29 )  PTT:29.5 sec              Hemoglobin A1C, Whole Blood: 6.0 % (01-14 @ 23:23)    Thyroid Stimulating Hormone, Serum: 1.34 uIU/mL (01-15 @ 00:08)          Consultant(s) Notes Reviewed:      Care Discussed with Consultants/Other Providers:

## 2020-01-16 VITALS
SYSTOLIC BLOOD PRESSURE: 111 MMHG | TEMPERATURE: 98 F | RESPIRATION RATE: 18 BRPM | OXYGEN SATURATION: 99 % | DIASTOLIC BLOOD PRESSURE: 75 MMHG | HEART RATE: 60 BPM

## 2020-01-16 LAB
GLUCOSE BLDC GLUCOMTR-MCNC: 100 MG/DL — HIGH (ref 70–99)
GLUCOSE BLDC GLUCOMTR-MCNC: 90 MG/DL — SIGNIFICANT CHANGE UP (ref 70–99)

## 2020-01-16 PROCEDURE — 85610 PROTHROMBIN TIME: CPT

## 2020-01-16 PROCEDURE — 82962 GLUCOSE BLOOD TEST: CPT

## 2020-01-16 PROCEDURE — 83690 ASSAY OF LIPASE: CPT

## 2020-01-16 PROCEDURE — 84443 ASSAY THYROID STIM HORMONE: CPT

## 2020-01-16 PROCEDURE — 80061 LIPID PANEL: CPT

## 2020-01-16 PROCEDURE — 93306 TTE W/DOPPLER COMPLETE: CPT

## 2020-01-16 PROCEDURE — 99285 EMERGENCY DEPT VISIT HI MDM: CPT | Mod: 25

## 2020-01-16 PROCEDURE — 96372 THER/PROPH/DIAG INJ SC/IM: CPT

## 2020-01-16 PROCEDURE — 86803 HEPATITIS C AB TEST: CPT

## 2020-01-16 PROCEDURE — 83036 HEMOGLOBIN GLYCOSYLATED A1C: CPT

## 2020-01-16 PROCEDURE — 85730 THROMBOPLASTIN TIME PARTIAL: CPT

## 2020-01-16 PROCEDURE — 80053 COMPREHEN METABOLIC PANEL: CPT

## 2020-01-16 PROCEDURE — 84484 ASSAY OF TROPONIN QUANT: CPT

## 2020-01-16 PROCEDURE — 71045 X-RAY EXAM CHEST 1 VIEW: CPT

## 2020-01-16 PROCEDURE — 97161 PT EVAL LOW COMPLEX 20 MIN: CPT

## 2020-01-16 PROCEDURE — 85027 COMPLETE CBC AUTOMATED: CPT

## 2020-01-16 PROCEDURE — 93005 ELECTROCARDIOGRAM TRACING: CPT

## 2020-01-16 PROCEDURE — 99238 HOSP IP/OBS DSCHRG MGMT 30/<: CPT

## 2020-01-16 PROCEDURE — 99232 SBSQ HOSP IP/OBS MODERATE 35: CPT

## 2020-01-16 RX ORDER — METOPROLOL TARTRATE 50 MG
1 TABLET ORAL
Qty: 0 | Refills: 0 | DISCHARGE
Start: 2020-01-16

## 2020-01-16 RX ORDER — QUETIAPINE FUMARATE 200 MG/1
1 TABLET, FILM COATED ORAL
Qty: 0 | Refills: 0 | DISCHARGE
Start: 2020-01-16

## 2020-01-16 RX ORDER — MAGNESIUM HYDROXIDE 400 MG/1
5 TABLET, CHEWABLE ORAL
Qty: 0 | Refills: 0 | DISCHARGE

## 2020-01-16 RX ORDER — QUETIAPINE FUMARATE 200 MG/1
1 TABLET, FILM COATED ORAL
Qty: 0 | Refills: 0 | DISCHARGE

## 2020-01-16 RX ORDER — ISOSORBIDE MONONITRATE 60 MG/1
1 TABLET, EXTENDED RELEASE ORAL
Qty: 0 | Refills: 0 | DISCHARGE

## 2020-01-16 RX ADMIN — Medication 81 MILLIGRAM(S): at 10:31

## 2020-01-16 RX ADMIN — ESCITALOPRAM OXALATE 5 MILLIGRAM(S): 10 TABLET, FILM COATED ORAL at 10:30

## 2020-01-16 RX ADMIN — LEVETIRACETAM 1000 MILLIGRAM(S): 250 TABLET, FILM COATED ORAL at 05:44

## 2020-01-16 RX ADMIN — OXCARBAZEPINE 600 MILLIGRAM(S): 300 TABLET, FILM COATED ORAL at 05:44

## 2020-01-16 RX ADMIN — Medication 25 MILLIGRAM(S): at 05:44

## 2020-01-16 RX ADMIN — QUETIAPINE FUMARATE 50 MILLIGRAM(S): 200 TABLET, FILM COATED ORAL at 10:20

## 2020-01-16 NOTE — PROGRESS NOTE ADULT - SUBJECTIVE AND OBJECTIVE BOX
INTERVAL HPI/OVERNIGHT EVENTS:    pt seen and examined  No new overnight event.  No N/V/D.  Tolerating diet.    MEDICATIONS  (STANDING):  aspirin  chewable 81 milliGRAM(s) Oral daily  dextrose 5%. 1000 milliLiter(s) (50 mL/Hr) IV Continuous <Continuous>  dextrose 50% Injectable 12.5 Gram(s) IV Push once  dextrose 50% Injectable 25 Gram(s) IV Push once  dextrose 50% Injectable 25 Gram(s) IV Push once  enoxaparin Injectable 40 milliGRAM(s) SubCutaneous daily  escitalopram 5 milliGRAM(s) Oral daily  insulin lispro (HumaLOG) corrective regimen sliding scale   SubCutaneous three times a day before meals  insulin lispro (HumaLOG) corrective regimen sliding scale   SubCutaneous at bedtime  levETIRAcetam 1000 milliGRAM(s) Oral two times a day  metoprolol succinate ER 25 milliGRAM(s) Oral daily  OXcarbazepine 600 milliGRAM(s) Oral two times a day  QUEtiapine 50 milliGRAM(s) Oral <User Schedule>  QUEtiapine 100 milliGRAM(s) Oral <User Schedule>  simvastatin 40 milliGRAM(s) Oral at bedtime  tamsulosin 0.4 milliGRAM(s) Oral at bedtime    MEDICATIONS  (PRN):  dextrose 40% Gel 15 Gram(s) Oral once PRN Blood Glucose LESS THAN 70 milliGRAM(s)/deciliter  glucagon  Injectable 1 milliGRAM(s) IntraMuscular once PRN Glucose LESS THAN 70 milligrams/deciliter      Allergies    morphine (Short breath; Rash)  morphine (Unknown)    Intolerances        Review of Systems:    General:  No wt loss, fevers, chills, night sweats,fatigue,   Eyes:  Good vision, no reported pain  ENT:  No sore throat, pain, runny nose, dysphagia  CV:  No pain, palpitations, hypo/hypertension  Resp:  No dyspnea, cough, tachypnea, wheezing  GI:  No pain, No nausea, No vomiting, No diarrhea, No constipation, No weight loss, No fever, No pruritis, No rectal bleeding, No melena, No dysphagia  :  No pain, bleeding, incontinence, nocturia  Muscle:  No pain, weakness  Neuro:  No weakness, tingling, memory problems  Psych:  No fatigue, insomnia, mood problems, depression  Endocrine:  No polyuria, polydypsia, cold/heat intolerance  Heme:  No petechiae, ecchymosis, easy bruisability  Skin:  No rash, tattoos, scars, edema      Vital Signs Last 24 Hrs  T(C): 36.7 (16 Jan 2020 11:04), Max: 36.7 (16 Jan 2020 11:04)  T(F): 98.1 (16 Jan 2020 11:04), Max: 98.1 (16 Jan 2020 11:04)  HR: 60 (16 Jan 2020 11:04) (60 - 69)  BP: 111/75 (16 Jan 2020 11:04) (108/69 - 124/75)  BP(mean): --  RR: 18 (16 Jan 2020 11:04) (18 - 18)  SpO2: 99% (16 Jan 2020 11:04) (97% - 99%)    PHYSICAL EXAM:    Constitutional: NAD, well-developed  HEENT: EOMI, throat clear  Neck: No LAD, supple  Respiratory: CTA and P  Cardiovascular: S1 and S2, RRR, no M  Gastrointestinal: BS+, soft, NT/ND, neg HSM,  Extremities: No peripheral edema, neg clubing, cyanosis  Vascular: 2+ peripheral pulses  Neurological: A/O x 3, no focal deficits  Psychiatric: Normal mood, normal affect  Skin: No rashes      LABS:                        9.0    4.49  )-----------( 314      ( 15 Betito 2020 09:04 )             31.3                 RADIOLOGY & ADDITIONAL TESTS:

## 2020-01-16 NOTE — DISCHARGE NOTE NURSING/CASE MANAGEMENT/SOCIAL WORK - PATIENT PORTAL LINK FT
You can access the FollowMyHealth Patient Portal offered by [facility] by registering at the following website: [email or web address]. By joining [facility]’s FollowMyHealth portal, you will also be able to view your health information using other applications (apps) compatible with our system.

## 2020-01-16 NOTE — PROGRESS NOTE ADULT - ASSESSMENT
64 M        h/o     epilepsy, CAD, GERD,  schizophrenia , HLD , depression , BPH, DM2, dementia, HTN, TIA       presents    from  n home  with  cp/  atypical/ pt is unreliable  historian  spoke  with  nursing home  dr libia lubin    troponin negative       h/o  schizophrenia/  epilepsy.  on  celexa/ keppra/  seroquel/ oxcarbazepine   h/o cad/ htn,  on asa/ zocor/  toprol  started  DM 2,  follow  fs  Anemia,  cbc in am/ no  melna.  brbpr/   gi eval  Echo, normal  ef/  w/p  per  card  ipt with   agitation ,   psych eval    noted/  per card,  needs capacity / psych to f/p  discussed with np    next of  kin. aunt/  Lia 64 M        h/o     epilepsy, CAD, GERD,  schizophrenia , HLD , depression , BPH, DM2, dementia, HTN, TIA       presents    from  n home  with  cp/  atypical/ pt is unreliable  historian  spoke  with  nursing home  dr libia lubin   cardiac w/p at other hosp, negative    troponin negative       h/o  schizophrenia/  epilepsy.  on  celexa/ keppra/  seroquel/ oxcarbazepine   h/o cad/ htn,  on asa/ zocor/  toprol  started  DM 2,  follow  fs  Anemia,  cbc in am/ no  melna.  brbpr/   gi eval  Echo, normal  ef/  w/p  per  card  ipt with   agitation ,   psych eval    noted/  per card,  no w/p needed  discussed with np    start  d/c  planning    next of  kin. aunt/  Lia

## 2020-01-16 NOTE — PROGRESS NOTE BEHAVIORAL HEALTH - NSBHFUPINTERVALHXFT_PSY_A_CORE
Pt seen and examined at bedside resting comfortably. Pt is A&O1, knows he is in the hospital.  Pt states he feels good and does not feel depressed or agitated. He also endorses a good appetite and states he has been sleeping well. He denies any pain. No SI/HI. No ETOH, tobacco or illicit drug use.  States he wants to go home.  Per staff, no acute events ON; no PRNs required.

## 2020-01-16 NOTE — DISCHARGE NOTE PROVIDER - CARE PROVIDER_API CALL
Edwin Santiago)  Internal Medicine  [address] [place] [address]  Phone: [phone]  Fax: [phone]  Follow Up Time: 1-3 days

## 2020-01-16 NOTE — DISCHARGE NOTE PROVIDER - HOSPITAL COURSE
64 M h/o epilepsy, CAD, GERD,  schizophrenia , HLD , depression , BPH, DM2, dementia, HTN, TIA    presents from home with  cp/atypical/pt is unreliable  historian    spoke  with  nursing home  dr anne marie lubin    troponin negative    h/o schizophrenia/epilepsy. On celexa/keppra/seroquel/oxcarbazepine     h/o cad/ htn,  on asa/zocor/toprol started    DM 2, follow fs    Anemia, cbc in am/no  melena.  brbpr/gi eval:no inpt w/u    Echo, normal  ef/ w/p  per  card    Pt with agitation, psych eval noted/per card, needs capacity / psych to f/p        Next of  kin aunt/ Lia 64 M h/o epilepsy, CAD, GERD,  schizophrenia , HLD , depression , BPH, DM2, dementia, HTN, TIA    presents from home with  cp/atypical/pt is unreliable  historian    spoke  with  nursing home  dr anne marie lubin    troponin negative    h/o schizophrenia/epilepsy. On celexa/keppra/seroquel/oxcarbazepine     h/o cad/ htn,  on asa/zocor/toprol started    DM 2, follow fs    Anemia, cbc in am/no  melena.  brbpr/gi eval:no inpt w/u    Echo, normal  ef/ w/p  per  card    Pt with agitation, psych eval noted    Discharge to NH with follow-up with Nursing Home MD        Next of  kin aunt/ Lia

## 2020-01-16 NOTE — PROGRESS NOTE BEHAVIORAL HEALTH - SUMMARY
64M single, domiciled at [facility], unemployed, with PPH schizophrenia and dementia, sees OP psychiatrist at NH, currently taking Celexa, Seroquel, Haldol dec, previous psychiatric admissions (?pt could not elaborate on most recent, NH staff say none in past 1-2 yrs), no substance abuse, with PMH significant for seizure d/o, CAD, HLD, h/o TIAs, DM II, anemia, GERD, h/o BPH, admitted for chest pain, previously discharged from [facility] for similar symptoms with negative cardiac workup. Psychiatry consulted for episode of agitation yesterday, received Haldol PRN with good effect.  Currently denies all psychiatric sx apart from intermittent sad mood, no SI/HI, currently calm and cooperative. NH staff report chronic aggression/agitation when pt does not get his way.  1/16: Calm ON; no acute behavioral events, no PRNs required, today reports good mood and wants to go back to his NH

## 2020-01-16 NOTE — PROGRESS NOTE BEHAVIORAL HEALTH - NSBHCHARTREVIEWVS_PSY_A_CORE FT
T(C): 36.7 (01-16-20 @ 11:04), Max: 36.7 (01-16-20 @ 11:04)  HR: 60 (01-16-20 @ 11:04) (60 - 61)  BP: 111/75 (01-16-20 @ 11:04) (108/69 - 112/72)  RR: 18 (01-16-20 @ 11:04) (18 - 18)  SpO2: 99% (01-16-20 @ 11:04) (98% - 99%)

## 2020-01-16 NOTE — DISCHARGE NOTE NURSING/CASE MANAGEMENT/SOCIAL WORK - NSDCPEPTSTRK_GEN_ALL_CORE
Call 911 for stroke/Signs and symptoms of stroke/Stroke support groups for patients, families, and friends/Stroke warning signs and symptoms/Need for follow up after discharge/Prescribed medications/Risk factors for stroke/Stroke education booklet

## 2020-01-16 NOTE — DISCHARGE NOTE PROVIDER - NSDCMRMEDTOKEN_GEN_ALL_CORE_FT
aspirin 81 mg oral tablet, chewable: 1 tab(s) orally once a day  citalopram 10 mg oral tablet: 1 tab(s) orally once a day  famotidine 20 mg oral tablet: 1 tab(s) orally 2 times a day  Haldol Decanoate 100 mg/mL intramuscular solution: 1 milliliter(s) intramuscular every 4 weeks  Haldol Decanoate 50 mg/mL intramuscular solution: 0.5 milliliter(s) intramuscular every 4 weeks  Imdur 30 mg oral tablet, extended release: 1 tab(s) orally once a day (in the morning)  Keppra 1000 mg oral tablet: 1 tab(s) orally 2 times a day  Milk of Magnesia 24% oral concentrate: 5 milliliter(s) orally once a day (at bedtime)  Mylanta oral suspension: 10 milliliter(s) orally 4 times a day (after meals and at bedtime), As Needed  OXcarbazepine 600 mg oral tablet: 1 tab(s) orally 2 times a day  SEROquel 50 mg oral tablet: 1 tab(s) orally 2 times a day  simvastatin 40 mg oral tablet: 1 tab(s) orally once a day (at bedtime)  tamsulosin 0.4 mg oral capsule: 1 cap(s) orally once a day aspirin 81 mg oral tablet, chewable: 1 tab(s) orally once a day  citalopram 10 mg oral tablet: 1 tab(s) orally once a day  famotidine 20 mg oral tablet: 1 tab(s) orally 2 times a day  Haldol Decanoate 100 mg/mL intramuscular solution: 1 milliliter(s) intramuscular every 4 weeks  Haldol Decanoate 50 mg/mL intramuscular solution: 0.5 milliliter(s) intramuscular every 4 weeks  Keppra 1000 mg oral tablet: 1 tab(s) orally 2 times a day  metoprolol succinate 25 mg oral tablet, extended release: 1 tab(s) orally once a day  OXcarbazepine 600 mg oral tablet: 1 tab(s) orally 2 times a day  QUEtiapine 100 mg oral tablet: 1 tab(s) orally at bedtime  QUEtiapine 50 mg oral tablet: 1 tab(s) orally  in the AM  simvastatin 40 mg oral tablet: 1 tab(s) orally once a day (at bedtime)  tamsulosin 0.4 mg oral capsule: 1 cap(s) orally once a day

## 2020-01-16 NOTE — PROGRESS NOTE ADULT - SUBJECTIVE AND OBJECTIVE BOX
CARDIOLOGY     PROGRESS  NOTE   ________________________________________________    CHIEF COMPLAINT:Patient is a 64y old  Male who presents with a chief complaint of CP (15 Betito 2020 16:21)  doing well, no complain  	  REVIEW OF SYSTEMS:  CONSTITUTIONAL: No fever, weight loss, or fatigue  EYES: No eye pain, visual disturbances, or discharge  ENT:  No difficulty hearing, tinnitus, vertigo; No sinus or throat pain  NECK: No pain or stiffness  RESPIRATORY: No cough, wheezing, chills or hemoptysis; no  Shortness of Breath  CARDIOVASCULAR: No chest pain, palpitations, passing out, dizziness, or leg swelling  GASTROINTESTINAL: No abdominal or epigastric pain. No nausea, vomiting, or hematemesis; No diarrhea or constipation. No melena or hematochezia.  GENITOURINARY: No dysuria, frequency, hematuria, or incontinence  NEUROLOGICAL: No headaches, memory loss, loss of strength, numbness, or tremors  SKIN: No itching, burning, rashes, or lesions   LYMPH Nodes: No enlarged glands  ENDOCRINE: No heat or cold intolerance; No hair loss  MUSCULOSKELETAL: No joint pain or swelling; No muscle, back, or extremity pain  PSYCHIATRIC: No depression, anxiety, mood swings, or difficulty sleeping  HEME/LYMPH: No easy bruising, or bleeding gums  ALLERGY AND IMMUNOLOGIC: No hives or eczema	    [ ] All others negative	  [ ] Unable to obtain    PHYSICAL EXAM:  T(C): 36.5 (01-16-20 @ 04:12), Max: 36.9 (01-15-20 @ 11:16)  HR: 61 (01-16-20 @ 04:12) (60 - 69)  BP: 108/69 (01-16-20 @ 04:12) (108/69 - 124/75)  RR: 18 (01-16-20 @ 04:12) (18 - 18)  SpO2: 98% (01-16-20 @ 04:12) (97% - 98%)  Wt(kg): --  I&O's Summary    15 Betito 2020 07:01  -  16 Jan 2020 07:00  --------------------------------------------------------  IN: 780 mL / OUT: 770 mL / NET: 10 mL        Appearance: Normal	  HEENT:   Normal oral mucosa, PERRL, EOMI	  Lymphatic: No lymphadenopathy  Cardiovascular: Normal S1 S2, No JVD,+ murmurs, No edema  Respiratory: Lungs clear to auscultation	  Psychiatry: A & O x 3, Mood & affect appropriate  Gastrointestinal:  Soft, Non-tender, + BS	  Skin: No rashes, No ecchymoses, No cyanosis	  Neurologic: Non-focal  Extremities: Normal range of motion, No clubbing, cyanosis or edema  Vascular: Peripheral pulses palpable 2+ bilaterally    MEDICATIONS  (STANDING):  aspirin  chewable 81 milliGRAM(s) Oral daily  dextrose 5%. 1000 milliLiter(s) (50 mL/Hr) IV Continuous <Continuous>  dextrose 50% Injectable 12.5 Gram(s) IV Push once  dextrose 50% Injectable 25 Gram(s) IV Push once  dextrose 50% Injectable 25 Gram(s) IV Push once  enoxaparin Injectable 40 milliGRAM(s) SubCutaneous daily  escitalopram 5 milliGRAM(s) Oral daily  insulin lispro (HumaLOG) corrective regimen sliding scale   SubCutaneous three times a day before meals  insulin lispro (HumaLOG) corrective regimen sliding scale   SubCutaneous at bedtime  levETIRAcetam 1000 milliGRAM(s) Oral two times a day  metoprolol succinate ER 25 milliGRAM(s) Oral daily  OXcarbazepine 600 milliGRAM(s) Oral two times a day  QUEtiapine 50 milliGRAM(s) Oral <User Schedule>  QUEtiapine 100 milliGRAM(s) Oral <User Schedule>  simvastatin 40 milliGRAM(s) Oral at bedtime  tamsulosin 0.4 milliGRAM(s) Oral at bedtime      TELEMETRY: 	    ECG:  	  RADIOLOGY:  OTHER: 	  	  LABS:	 	    CARDIAC MARKERS:                                9.0    4.49  )-----------( 314      ( 15 Betito 2020 09:04 )             31.3           proBNP:   Lipid Profile: Cholesterol 157  LDL 98  HDL 42  TG 83    HgA1c: Hemoglobin A1C, Whole Blood: 6.0 % (01-14 @ 23:23)    TSH: Thyroid Stimulating Hormone, Serum: 1.07 uIU/mL (01-15 @ 09:03)  Thyroid Stimulating Hormone, Serum: 1.34 uIU/mL (01-15 @ 00:08)    PT/INR - ( 14 Jan 2020 09:29 )   PT: 13.2 sec;   INR: 1.14 ratio         PTT - ( 14 Jan 2020 09:29 )  PTT:29.5 sec  < from: Transthoracic Echocardiogram (01.14.20 @ 15:30) >  1. Normal left ventricular systolic function. No segmental  wall motion abnormalities.  2. Normal right ventricular size and function.  3. Estimated right ventricular systolic pressure equals 42  mm Hg, assuming right atrial pressure equals 8 mm Hg,  consistent with mild pulmonary hypertension.    < end of copied text >      Assessment and plan  ---------------------------  64 M with epilepsy, CAD, GERD, schizophrenia, HLD, depression, BPH, DM2, dementia, HTN, TIA presents to ED from nursing home for CP. Patient was admitted to [facility] 2 days ago for same symptoms with negative cardiac work-up and apparently discharged to nursing home, per EMS report. On initial presentation to ED, patient was agitated and yelling at staff. He was given haldol and ativan and is now slow to respond. Nursing home records include diagnoses and home medications, but not much else. Patient is prescribed 125 ug haldol q month.    Patient unable to answer questions currently: very slow to respond. Currently A+Ox1. Cannot elaborate on CP. Had a run on SVT on telemetry, which self-resolved. Vitals are otherwise stable. (14 Jan 2020 05:42)   pt with known  hx of cad with chest pain  asa daily  beta blocker  need to get info from [facility]  dvt prophylaxis  repeat ecg  echo noted  pt needs stress test ?  cardiac ct nancy not sure pt can consent  psych consult

## 2020-01-16 NOTE — PROGRESS NOTE ADULT - SUBJECTIVE AND OBJECTIVE BOX
tele.  nsr    REVIEW OF SYSTEMS:  GEN: no fever,    no chills  RESP: no SOB,   no cough  CVS: no chest pain,   no palpitations  GI: no abdominal pain,   no nausea,   no vomiting,   no constipation,   no diarrhea  : no dysuria,   no frequency  NEURO: no headache,   no dizziness  PSYCH: no depression,   not anxious  Derm : no rash    MEDICATIONS  (STANDING):  aspirin  chewable 81 milliGRAM(s) Oral daily  dextrose 5%. 1000 milliLiter(s) (50 mL/Hr) IV Continuous <Continuous>  dextrose 50% Injectable 12.5 Gram(s) IV Push once  dextrose 50% Injectable 25 Gram(s) IV Push once  dextrose 50% Injectable 25 Gram(s) IV Push once  enoxaparin Injectable 40 milliGRAM(s) SubCutaneous daily  escitalopram 5 milliGRAM(s) Oral daily  insulin lispro (HumaLOG) corrective regimen sliding scale   SubCutaneous three times a day before meals  insulin lispro (HumaLOG) corrective regimen sliding scale   SubCutaneous at bedtime  levETIRAcetam 1000 milliGRAM(s) Oral two times a day  metoprolol succinate ER 25 milliGRAM(s) Oral daily  OXcarbazepine 600 milliGRAM(s) Oral two times a day  QUEtiapine 50 milliGRAM(s) Oral <User Schedule>  QUEtiapine 100 milliGRAM(s) Oral <User Schedule>  simvastatin 40 milliGRAM(s) Oral at bedtime  tamsulosin 0.4 milliGRAM(s) Oral at bedtime    MEDICATIONS  (PRN):  dextrose 40% Gel 15 Gram(s) Oral once PRN Blood Glucose LESS THAN 70 milliGRAM(s)/deciliter  glucagon  Injectable 1 milliGRAM(s) IntraMuscular once PRN Glucose LESS THAN 70 milligrams/deciliter      Vital Signs Last 24 Hrs  T(C): 36.5 (16 Jan 2020 04:12), Max: 36.9 (15 Betito 2020 11:16)  T(F): 97.7 (16 Jan 2020 04:12), Max: 98.5 (15 Betito 2020 11:16)  HR: 61 (16 Jan 2020 04:12) (60 - 69)  BP: 108/69 (16 Jan 2020 04:12) (108/69 - 124/75)  BP(mean): --  RR: 18 (16 Jan 2020 04:12) (18 - 18)  SpO2: 98% (16 Jan 2020 04:12) (97% - 98%)  CAPILLARY BLOOD GLUCOSE      POCT Blood Glucose.: 90 mg/dL (16 Jan 2020 08:04)  POCT Blood Glucose.: 118 mg/dL (15 Betito 2020 21:32)  POCT Blood Glucose.: 106 mg/dL (15 Betito 2020 11:58)    I&O's Summary    15 Betito 2020 07:01  -  16 Jan 2020 07:00  --------------------------------------------------------  IN: 780 mL / OUT: 770 mL / NET: 10 mL        PHYSICAL EXAM:  HEAD:  Atraumatic, Normocephalic  NECK: Supple, No   JVD  CHEST/LUNG:   no     rales,     no,    rhonchi  HEART: Regular rate and rhythm;         murmur  ABDOMEN: Soft, Nontender, ;   EXTREMITIES:    no    edema  NEUROLOGY:  alert    LABS:                        9.0    4.49  )-----------( 314      ( 15 Betito 2020 09:04 )             31.3           PT/INR - ( 14 Jan 2020 09:29 )   PT: 13.2 sec;   INR: 1.14 ratio         PTT - ( 14 Jan 2020 09:29 )  PTT:29.5 sec              Hemoglobin A1C, Whole Blood: 6.0 % (01-14 @ 23:23)    Thyroid Stimulating Hormone, Serum: 1.07 uIU/mL (01-15 @ 09:03)          Consultant(s) Notes Reviewed:      Care Discussed with Consultants/Other Providers:

## 2020-01-16 NOTE — PROGRESS NOTE BEHAVIORAL HEALTH - RISK ASSESSMENT
Risk factors:  psychotic illness, h/o chronic agitation/aggression, h/o previous psych admissions, diminished self-care/disability requiring NH level of care    Protective factors: no current SIIP/HIIP, no h/o SA/SIB, no access to weapons, no active substance abuse,  domiciled in NH, compliant with treatment    Overall, pt is at chronically elevated risk mitigated by mult protective factors; does not meet criteria for psychiatric admission.

## 2020-01-16 NOTE — PROVIDER CONTACT NOTE (OTHER) - SITUATION
Patient refusing morning labs. Patient agitated. Phlebotomist attempted to stick the patient, however patient was too agitated to continue

## 2020-01-16 NOTE — PROGRESS NOTE ADULT - ASSESSMENT
anemia  chest pain    no overt gi bleed  hgb stable   diet as tolerated   no objection to dc planning     Advanced care planning was discussed with patient and family.  Advanced care planning forms were reviewed and discussed.  Risks, benefits and alternatives of gastroenterologic procedures were discussed in detail and all questions were answered.    30 minutes spent.

## 2020-01-16 NOTE — PROGRESS NOTE BEHAVIORAL HEALTH - NSBHCONSULTMEDS_PSY_A_CORE FT
1. C/w Lexapro 5mg PO daily    2. C/w Haldol dec 125mg IM qmonthly (next due 2/3/19)    3. Increase Seroquel to 50mg PO daily and 100mg PO qHS    4. Haldol 5mg PO/IV q6h PRN agitation, monitor EKG for QTc<500

## 2020-04-07 ENCOUNTER — EMERGENCY (EMERGENCY)
Facility: HOSPITAL | Age: 65
LOS: 1 days | Discharge: HOME | End: 2020-04-07
Attending: STUDENT IN AN ORGANIZED HEALTH CARE EDUCATION/TRAINING PROGRAM
Payer: MEDICAID

## 2020-04-07 VITALS
SYSTOLIC BLOOD PRESSURE: 124 MMHG | DIASTOLIC BLOOD PRESSURE: 80 MMHG | HEIGHT: 71 IN | RESPIRATION RATE: 16 BRPM | WEIGHT: 169.98 LBS | HEART RATE: 78 BPM | TEMPERATURE: 99 F | OXYGEN SATURATION: 96 %

## 2020-04-07 DIAGNOSIS — F01.50 VASCULAR DEMENTIA WITHOUT BEHAVIORAL DISTURBANCE: ICD-10-CM

## 2020-04-07 DIAGNOSIS — F25.9 SCHIZOAFFECTIVE DISORDER, UNSPECIFIED: ICD-10-CM

## 2020-04-07 LAB
ANION GAP SERPL CALC-SCNC: 15 MMOL/L — SIGNIFICANT CHANGE UP (ref 5–17)
APAP SERPL-MCNC: <15 UG/ML — SIGNIFICANT CHANGE UP (ref 10–30)
BASOPHILS # BLD AUTO: 0.01 K/UL — SIGNIFICANT CHANGE UP (ref 0–0.2)
BASOPHILS NFR BLD AUTO: 0.2 % — SIGNIFICANT CHANGE UP (ref 0–2)
BUN SERPL-MCNC: 14 MG/DL — SIGNIFICANT CHANGE UP (ref 7–23)
CALCIUM SERPL-MCNC: 9.2 MG/DL — SIGNIFICANT CHANGE UP (ref 8.4–10.5)
CHLORIDE SERPL-SCNC: 103 MMOL/L — SIGNIFICANT CHANGE UP (ref 96–108)
CO2 SERPL-SCNC: 23 MMOL/L — SIGNIFICANT CHANGE UP (ref 22–31)
CREAT SERPL-MCNC: 0.69 MG/DL — SIGNIFICANT CHANGE UP (ref 0.5–1.3)
EOSINOPHIL # BLD AUTO: 0.05 K/UL — SIGNIFICANT CHANGE UP (ref 0–0.5)
EOSINOPHIL NFR BLD AUTO: 0.8 % — SIGNIFICANT CHANGE UP (ref 0–6)
ETHANOL SERPL-MCNC: SIGNIFICANT CHANGE UP MG/DL (ref 0–10)
GLUCOSE SERPL-MCNC: 95 MG/DL — SIGNIFICANT CHANGE UP (ref 70–99)
HCT VFR BLD CALC: 28.6 % — LOW (ref 39–50)
HGB BLD-MCNC: 8.1 G/DL — LOW (ref 13–17)
IMM GRANULOCYTES NFR BLD AUTO: 0.9 % — SIGNIFICANT CHANGE UP (ref 0–1.5)
LYMPHOCYTES # BLD AUTO: 0.88 K/UL — LOW (ref 1–3.3)
LYMPHOCYTES # BLD AUTO: 13.8 % — SIGNIFICANT CHANGE UP (ref 13–44)
MCHC RBC-ENTMCNC: 21.5 PG — LOW (ref 27–34)
MCHC RBC-ENTMCNC: 28.3 GM/DL — LOW (ref 32–36)
MCV RBC AUTO: 75.9 FL — LOW (ref 80–100)
MONOCYTES # BLD AUTO: 0.55 K/UL — SIGNIFICANT CHANGE UP (ref 0–0.9)
MONOCYTES NFR BLD AUTO: 8.6 % — SIGNIFICANT CHANGE UP (ref 2–14)
NEUTROPHILS # BLD AUTO: 4.83 K/UL — SIGNIFICANT CHANGE UP (ref 1.8–7.4)
NEUTROPHILS NFR BLD AUTO: 75.7 % — SIGNIFICANT CHANGE UP (ref 43–77)
NRBC # BLD: 0 /100 WBCS — SIGNIFICANT CHANGE UP (ref 0–0)
PLATELET # BLD AUTO: 163 K/UL — SIGNIFICANT CHANGE UP (ref 150–400)
POTASSIUM SERPL-MCNC: 3.7 MMOL/L — SIGNIFICANT CHANGE UP (ref 3.5–5.3)
POTASSIUM SERPL-SCNC: 3.7 MMOL/L — SIGNIFICANT CHANGE UP (ref 3.5–5.3)
RBC # BLD: 3.77 M/UL — LOW (ref 4.2–5.8)
RBC # FLD: 19 % — HIGH (ref 10.3–14.5)
SALICYLATES SERPL-MCNC: <2 MG/DL — LOW (ref 15–30)
SODIUM SERPL-SCNC: 141 MMOL/L — SIGNIFICANT CHANGE UP (ref 135–145)
WBC # BLD: 6.38 K/UL — SIGNIFICANT CHANGE UP (ref 3.8–10.5)
WBC # FLD AUTO: 6.38 K/UL — SIGNIFICANT CHANGE UP (ref 3.8–10.5)

## 2020-04-07 PROCEDURE — 70450 CT HEAD/BRAIN W/O DYE: CPT

## 2020-04-07 PROCEDURE — 90792 PSYCH DIAG EVAL W/MED SRVCS: CPT | Mod: 95

## 2020-04-07 PROCEDURE — 85027 COMPLETE CBC AUTOMATED: CPT

## 2020-04-07 PROCEDURE — 93005 ELECTROCARDIOGRAM TRACING: CPT

## 2020-04-07 PROCEDURE — 70450 CT HEAD/BRAIN W/O DYE: CPT | Mod: 26

## 2020-04-07 PROCEDURE — 80307 DRUG TEST PRSMV CHEM ANLYZR: CPT

## 2020-04-07 PROCEDURE — 99284 EMERGENCY DEPT VISIT MOD MDM: CPT

## 2020-04-07 PROCEDURE — 80048 BASIC METABOLIC PNL TOTAL CA: CPT

## 2020-04-07 PROCEDURE — 93010 ELECTROCARDIOGRAM REPORT: CPT

## 2020-04-07 PROCEDURE — 99285 EMERGENCY DEPT VISIT HI MDM: CPT

## 2020-04-07 RX ORDER — QUETIAPINE FUMARATE 200 MG/1
50 TABLET, FILM COATED ORAL ONCE
Refills: 0 | Status: COMPLETED | OUTPATIENT
Start: 2020-04-07 | End: 2020-04-07

## 2020-04-07 RX ADMIN — QUETIAPINE FUMARATE 50 MILLIGRAM(S): 200 TABLET, FILM COATED ORAL at 23:13

## 2020-04-07 NOTE — ED PROVIDER NOTE - PMH
CAD (coronary artery disease)    CVA (cerebral vascular accident)    Dysarthria    History of BPH    History of TIAs    HLD (hyperlipidemia)    HTN (hypertension)    Hyperlipidemia    Schizophrenia    Schizophrenia    Seizure disorder    Type 2 diabetes mellitus

## 2020-04-07 NOTE — ED BEHAVIORAL HEALTH NOTE - BEHAVIORAL HEALTH NOTE
===================  PRE-HOSPITAL COURSE  ===================  SOURCE:  RN and triage documentation.   DETAILS:  Patient was BIBEMS from nursing home.     ============  ED COURSE   ============  SOURCE:  RN and triage documentation.   ARRIVAL:  Patient was cooperative upon arrival and allowed for gowning/wanding without incident. Patient appears slightly disheveled.   BELONGINGS: None notable; items stowed with security.    BEHAVIOR: Patient has been uncooperative answering assessment questions, however allowed for blood/urine collection for labs. Patient does not respond to assessment questions. Patient has been sleeping since arrival to ED.   TREATMENT:  Patient has not required treatment while in ED.   VISITORS: Patient is unaccompanied by social supports.

## 2020-04-07 NOTE — ED BEHAVIORAL HEALTH ASSESSMENT NOTE - HPI (INCLUDE ILLNESS QUALITY, SEVERITY, DURATION, TIMING, CONTEXT, MODIFYING FACTORS, ASSOCIATED SIGNS AND SYMPTOMS)
64M single, domiciled at [facility], unemployed, with PPH schizophrenia and dementia, sees OP psychiatrist at NH, currently taking Celexa, Seroquel and Haldol dec were discontinued per NH for unclear reasons, previous psychiatric admissions (?pt could not elaborate on most recent, NH staff say none in past 1-2 yrs), no substance abuse, with PMH significant for seizure d/o, CAD, HLD, h/o TIAs, DM II, anemia, GERD, h/o BPH, bib EMS activated by nursing [place] for patient being more agitated, confused, expressed SI, previously discharged from [facility] twice recently for similar symptoms.    On evaluation, pt is sleepy, aaox2 to self and place but does not know time or reason he is at the ED.  Patient is minimally verbal on evaluation, states he is not feeling well, unable to identify whether he is not feeling well due to psychiatric or medical reasons.  When asked about SI, he said, "I got no reason to live", he denied any plan/intent.  Patient is unable to tell if he has any psychosicial stressors, said he is not sleeping so well, does not answer any questions about appetite, concentration, or energy.  Patient denied HI/AVH, reports compliance to medication, no issues with staff at NH.  Patient sees psychiatrist weekly on Thursdays.    Collateral information has been obtained from WILDA Baez at [facility] at [phone], who states that patient is more verbal, communicative, and calm at baseline.  For the past week or so, there are moments when patient was more mute, and throws tantrum, throwing self on the floor, yelling at staff, but does not have physical violence.  Patient also has possibly exposed to covid and had fever, received tylenol prior to coming to ED. Patient also expressed SI at NH but has no plan or intent.  Patient was sent to [facility] for agitation yesterday and few days ago and was discharged both time.  Per RN, patient is no longer on standing haldol dec or seroquel, and he only receives PRN haldol for agitation.     However, per chart review, patient has paranoia, episodes of agitation, poor frustration tolerance, consistent with symptoms of dementia.

## 2020-04-07 NOTE — ED PROVIDER NOTE - NSFOLLOWUPINSTRUCTIONS_ED_ALL_ED_FT
Please restart your serquel 50 mg qhs.   Please follow up with psychiatry in your facility with in the next 2-3 days.     You were seen in the emergency department today and we recommend that you return if you develop chest pain, abdominal pain, shortness of breath, lightheadedness, vomiting, leg swelling, fever, headache, slurred speech, weakness or blurry vision.     Please follow up with your primary care doctor over the next 2-3 days for further management of your symptoms and to review your bloodwork to ensure no additional tests, imaging or bloodwork, need to be performed. Please restart your serquel 50 mg nightly   Please follow up with psychiatry in your facility with in the next 2-3 days.     You were seen in the emergency department today and we recommend that you return if you develop chest pain, abdominal pain, shortness of breath, lightheadedness, vomiting, leg swelling, fever, headache, slurred speech, weakness or blurry vision.     Please follow up with your primary care doctor over the next 2-3 days for further management of your symptoms and to review your bloodwork to ensure no additional tests, imaging or bloodwork, need to be performed.

## 2020-04-07 NOTE — ED BEHAVIORAL HEALTH ASSESSMENT NOTE - OTHER
WILDA Baez at [facility] Resides in nursing home supportive NH staff defer to ED concrete

## 2020-04-07 NOTE — ED PROVIDER NOTE - CARE PLAN
Principal Discharge DX:	Schizoaffective disorder, unspecified type  Secondary Diagnosis:	Suspected 2019 novel coronavirus infection

## 2020-04-07 NOTE — ED BEHAVIORAL HEALTH ASSESSMENT NOTE - FAMILY HISTORY OF SUBSTANCE ABUSE
Was the patient seen in the last year in this department? Yes     Does patient have an active prescription for medications requested? No     Received Request Via: Pharmacy   Unable to assess

## 2020-04-07 NOTE — ED BEHAVIORAL HEALTH ASSESSMENT NOTE - SUMMARY
64M single, domiciled at [facility], unemployed, with PPH schizophrenia and dementia, sees OP psychiatrist at NH, currently taking Celexa, no longer on Seroque or Haldol dec, previous psychiatric admissions (?pt could not elaborate on most recent, NH staff say none in past 1-2 yrs), no substance abuse, with PMH significant for seizure d/o, CAD, HLD, h/o TIAs, DM II, anemia, GERD, h/o BPH, bib EMS activated by NH for agitation and expressing SI with no plan or intent.  NH staff report chronic aggression/agitation when pt does not get his way.  Patient is calm on evaluation, reports passive SI, no active SI/plan/intent/HI/AVH, sleepy on exam, limited historian.  Patient has proper supervision, and sees psychiatrist at NH, no acute safety issue or agitation noted at this time.  Agitation and poor frustration tolerance appear to be consistent with dementia, patient does not appear to have symptoms benefit from treatment at psychiatric admission at this time, but will benefit from more thorough medical work up such as UA, CXR, covid test to r/o acute medical cause, may consult psych CL team if being admitted medically for further work up.  Telepsych will sign off at this time, but will remain available if needed.

## 2020-04-07 NOTE — ED PROVIDER NOTE - PATIENT PORTAL LINK FT
You can access the FollowMyHealth Patient Portal offered by [facility] by registering at the following website: [email or web address]. By joining [employer]’s FollowMyHealth portal, you will also be able to view your health information using other applications (apps) compatible with our system.

## 2020-04-07 NOTE — ED BEHAVIORAL HEALTH ASSESSMENT NOTE - RISK ASSESSMENT
Risk factors:  psychotic illness, h/o chronic agitation/aggression, h/o previous psych admissions, diminished self-care/disability requiring NH level of care    Protective factors: no current acute, no h/o SA/SIB, no access to weapons, no active substance abuse,  domiciled in NH, compliant with treatment    Overall, pt is at chronically elevated risk mitigated by mult protective factors; does not meet criteria for psychiatric admission. Low Acute Suicide Risk

## 2020-04-07 NOTE — ED BEHAVIORAL HEALTH ASSESSMENT NOTE - FAMILY HISTORY OF PSYCHIATRIC ILLNESS / SUICIDALITY
How Severe Is Your Skin Lesion?: mild Has Your Skin Lesion Been Treated?: not been treated Is This A New Presentation, Or A Follow-Up?: Skin Lesion None known

## 2020-04-07 NOTE — ED PROVIDER NOTE - CLINICAL SUMMARY MEDICAL DECISION MAKING FREE TEXT BOX
Brittany Riojas MD 64 M w/ PMH HTN, Depression presents to the ED from [facility] an rehab, w/ SI and fever. Pt received tylenol at facility prior to arrival. He is currently nonverbal, he reports that he wants to hurt himself but he hasn't had any self harm behavior. Pt is able to nod yes and no to questions. He has no plan. Pt is able to follow commands. On exam, pt is awake and alert to self and place. Pt has no signs of self harm on exam, he has no abdominal tenderness, lungs clear, sating 99 percent on RA. plan for labs,

## 2020-04-07 NOTE — ED ADULT NURSE NOTE - OBJECTIVE STATEMENT
BIB EMS from his facility for eval after he made an outburst that he wanted to hurt himself.  Upon arrival he is nonverbal, but cooperative.  He later deja known that he did want to hurt himself, but had no plan.  Resting quietly in bed, 1:1 in progress.

## 2020-04-07 NOTE — ED PROVIDER NOTE - PROGRESS NOTE DETAILS
Amrik: Contacted [facility] [place] [phone]).  Over the past week, patient has been increasingly agitated shouting aggressively and yelling that he wants to kill himself.  These outbursts have been becoming more frequent and he has been sent to [facility] for evaluation and was discharged back both times.  Staff also states he has had a fever with covid exposure.  Patient denies symptoms, currently 99.1 orally.  If patient were to be discharged, facility has area to isolate covid+ patients. spoke with tele psych, will see pt shortly. -Humera Stone PA-C MD Dr Cherri Coffey, w/ tele psych, has passive SI but no intent, spoke w/ the NH and he has hx of agitation, pt has been taken off of seroquel and haldol, pt should be started on seroquel 50 mg qhs.

## 2020-04-07 NOTE — ED BEHAVIORAL HEALTH ASSESSMENT NOTE - REFERRAL / APPOINTMENT DETAILS
If discharged, patient to follow up with psychiatrist at NH, if medical admission is deemed necessary, medical team can call psych CL team to follow

## 2020-04-07 NOTE — ED PROVIDER NOTE - OBJECTIVE STATEMENT
65yo male with pmh schizophrenia, HLD, BPH, DM, CAD, GERD, depression, dementia, tia, HTN, presenting with SI and agitation.  Patient nonverbal during exam but nods head to answer.  Admits to thoughts of harming self.  Denies hallucinations.  Does not admit to fevers, cough, sob, chest pain.  Received tylenol prior to arrival.

## 2020-04-07 NOTE — ED PROVIDER NOTE - PHYSICAL EXAMINATION
General appearance: NAD, afebrile    Neck: Trachea midline; Full range of motion, supple   Pulm: normal respiratory effort and no intercostal retractions, normal work of breathing   Extremities: No peripheral edema   Skin: Dry, normal temperature, turgor and texture   Psych: Appropriate affect, cooperative;

## 2020-04-07 NOTE — ED BEHAVIORAL HEALTH ASSESSMENT NOTE - DESCRIPTION
Please see [facility] free text note. PMH of epilepsy, CAD, HLD, h/o TIAs, DM II, anemia, GERD, h/o BPH see hpi

## 2020-04-08 VITALS
RESPIRATION RATE: 18 BRPM | DIASTOLIC BLOOD PRESSURE: 70 MMHG | TEMPERATURE: 99 F | HEART RATE: 71 BPM | OXYGEN SATURATION: 99 % | SYSTOLIC BLOOD PRESSURE: 100 MMHG

## 2020-04-08 PROBLEM — I25.10 ATHEROSCLEROTIC HEART DISEASE OF NATIVE CORONARY ARTERY WITHOUT ANGINA PECTORIS: Chronic | Status: ACTIVE | Noted: 2019-12-06

## 2020-04-08 PROBLEM — E78.5 HYPERLIPIDEMIA, UNSPECIFIED: Chronic | Status: ACTIVE | Noted: 2019-12-06

## 2020-04-08 PROBLEM — I63.9 CEREBRAL INFARCTION, UNSPECIFIED: Chronic | Status: ACTIVE | Noted: 2019-12-06

## 2020-04-08 PROBLEM — E11.9 TYPE 2 DIABETES MELLITUS WITHOUT COMPLICATIONS: Chronic | Status: ACTIVE | Noted: 2020-01-14

## 2020-04-08 PROBLEM — F20.9 SCHIZOPHRENIA, UNSPECIFIED: Chronic | Status: ACTIVE | Noted: 2019-12-06

## 2020-04-08 PROBLEM — E78.5 HYPERLIPIDEMIA, UNSPECIFIED: Chronic | Status: ACTIVE | Noted: 2020-01-14

## 2020-04-08 PROBLEM — I10 ESSENTIAL (PRIMARY) HYPERTENSION: Chronic | Status: ACTIVE | Noted: 2019-12-06

## 2020-04-08 PROBLEM — F20.9 SCHIZOPHRENIA, UNSPECIFIED: Chronic | Status: ACTIVE | Noted: 2020-01-14

## 2020-04-08 PROBLEM — G40.909 EPILEPSY, UNSPECIFIED, NOT INTRACTABLE, WITHOUT STATUS EPILEPTICUS: Chronic | Status: ACTIVE | Noted: 2019-12-06

## 2020-04-08 PROBLEM — Z87.438 PERSONAL HISTORY OF OTHER DISEASES OF MALE GENITAL ORGANS: Chronic | Status: ACTIVE | Noted: 2020-01-14

## 2020-04-08 PROBLEM — R47.1 DYSARTHRIA AND ANARTHRIA: Chronic | Status: ACTIVE | Noted: 2019-12-06

## 2020-04-08 PROBLEM — Z86.73 PERSONAL HISTORY OF TRANSIENT ISCHEMIC ATTACK (TIA), AND CEREBRAL INFARCTION WITHOUT RESIDUAL DEFICITS: Chronic | Status: ACTIVE | Noted: 2020-01-14

## 2020-04-08 NOTE — ED ADULT NURSE REASSESSMENT NOTE - NS ED NURSE REASSESS COMMENT FT1
pt appears asleep, rise and fall of chest observed, 1:1 at bedside, NAD.
pt awaiting discharge transportation back to living facility. no apparent distress, 1:1 sitter at bedside, pt appears to be sleeping, rise and fall of chest observed. NAD.
pt awaiting psych consult. 1:1 at bedside, pt appears to be sleeping, rise and fall of chest observed. NAD.

## 2023-09-07 NOTE — BEHAVIORAL HEALTH ASSESSMENT NOTE - INSIGHT (REGARDING PSYCHIATRIC ILLNESS)
Poor Terbinafine Pregnancy And Lactation Text: This medication is Pregnancy Category B and is considered safe during pregnancy. It is also excreted in breast milk and breast feeding isn't recommended.

## 2023-09-19 NOTE — PROGRESS NOTE BEHAVIORAL HEALTH - NSBHCHARTREVIEWINVESTIGATE_PSY_A_CORE FT
Bed in lowest position, wheels locked, appropriate side rails in place/Call bell, personal items and telephone in reach/Instruct patient to call for assistance before getting out of bed or chair/Non-slip footwear when patient is out of bed/[place] to call system/Physically safe environment - no spills, clutter or unnecessary equipment/Purposeful Proactive Rounding/Room/bathroom lighting operational, light cord in reach
QTc in 400s- - in chart
